# Patient Record
Sex: FEMALE | Race: OTHER | NOT HISPANIC OR LATINO
[De-identification: names, ages, dates, MRNs, and addresses within clinical notes are randomized per-mention and may not be internally consistent; named-entity substitution may affect disease eponyms.]

---

## 2017-05-19 ENCOUNTER — LABORATORY RESULT (OUTPATIENT)
Age: 22
End: 2017-05-19

## 2017-05-19 ENCOUNTER — APPOINTMENT (OUTPATIENT)
Dept: OBGYN | Facility: CLINIC | Age: 22
End: 2017-05-19

## 2017-05-19 ENCOUNTER — OUTPATIENT (OUTPATIENT)
Dept: OUTPATIENT SERVICES | Facility: HOSPITAL | Age: 22
LOS: 1 days | End: 2017-05-19
Payer: SELF-PAY

## 2017-05-19 ENCOUNTER — RESULT REVIEW (OUTPATIENT)
Age: 22
End: 2017-05-19

## 2017-05-19 VITALS — WEIGHT: 164 LBS | SYSTOLIC BLOOD PRESSURE: 114 MMHG | DIASTOLIC BLOOD PRESSURE: 70 MMHG

## 2017-05-19 DIAGNOSIS — Z01.419 ENCOUNTER FOR GYNECOLOGICAL EXAMINATION (GENERAL) (ROUTINE) W/OUT ABNORMAL FINDINGS: ICD-10-CM

## 2017-05-19 DIAGNOSIS — Z12.4 ENCOUNTER FOR GYNECOLOGICAL EXAMINATION (GENERAL) (ROUTINE) W/OUT ABNORMAL FINDINGS: ICD-10-CM

## 2017-05-19 DIAGNOSIS — Z30.9 ENCOUNTER FOR CONTRACEPTIVE MANAGEMENT, UNSPECIFIED: ICD-10-CM

## 2017-05-19 DIAGNOSIS — N76.0 ACUTE VAGINITIS: ICD-10-CM

## 2017-05-19 PROCEDURE — 87340 HEPATITIS B SURFACE AG IA: CPT

## 2017-05-19 PROCEDURE — 88141 CYTOPATH C/V INTERPRET: CPT

## 2017-05-19 PROCEDURE — 86803 HEPATITIS C AB TEST: CPT

## 2017-05-19 PROCEDURE — G0463: CPT

## 2017-05-19 PROCEDURE — 86592 SYPHILIS TEST NON-TREP QUAL: CPT

## 2017-05-19 PROCEDURE — 88175 CYTOPATH C/V AUTO FLUID REDO: CPT

## 2017-05-19 PROCEDURE — 87389 HIV-1 AG W/HIV-1&-2 AB AG IA: CPT

## 2017-05-19 RX ORDER — NORETHINDRONE ACETATE AND ETHINYL ESTRADIOL 1MG-20(21)
1-20 KIT ORAL
Qty: 90 | Refills: 3 | Status: ACTIVE | COMMUNITY
Start: 2017-05-19 | End: 1900-01-01

## 2017-05-20 LAB
C TRACH RRNA SPEC QL NAA+PROBE: SIGNIFICANT CHANGE UP
CANDIDA AB TITR SER: DETECTED
G VAGINALIS DNA SPEC QL NAA+PROBE: DETECTED
HBV SURFACE AG SER-ACNC: SIGNIFICANT CHANGE UP
HCV AB S/CO SERPL IA: 0.16 S/CO — SIGNIFICANT CHANGE UP
HCV AB SERPL-IMP: SIGNIFICANT CHANGE UP
HIV 1+2 AB+HIV1 P24 AG SERPL QL IA: SIGNIFICANT CHANGE UP
N GONORRHOEA RRNA SPEC QL NAA+PROBE: SIGNIFICANT CHANGE UP
RPR SERPL-ACNC: SIGNIFICANT CHANGE UP
SPECIMEN SOURCE: SIGNIFICANT CHANGE UP
T VAGINALIS SPEC QL WET PREP: SIGNIFICANT CHANGE UP

## 2017-05-22 ENCOUNTER — OTHER (OUTPATIENT)
Age: 22
End: 2017-05-22

## 2017-05-22 DIAGNOSIS — N89.8 OTHER SPECIFIED NONINFLAMMATORY DISORDERS OF VAGINA: ICD-10-CM

## 2017-05-22 PROBLEM — Z01.419 ENCOUNTER FOR GYNECOLOGICAL EXAMINATION WITH PAPANICOLAOU SMEAR OF CERVIX: Status: RESOLVED | Noted: 2017-05-19 | Resolved: 2017-05-22

## 2017-05-23 DIAGNOSIS — Z01.419 ENCOUNTER FOR GYNECOLOGICAL EXAMINATION (GENERAL) (ROUTINE) WITHOUT ABNORMAL FINDINGS: ICD-10-CM

## 2017-05-23 DIAGNOSIS — N89.8 OTHER SPECIFIED NONINFLAMMATORY DISORDERS OF VAGINA: ICD-10-CM

## 2017-05-23 RX ORDER — METRONIDAZOLE 500 MG/1
500 TABLET ORAL TWICE DAILY
Qty: 10 | Refills: 0 | Status: ACTIVE | COMMUNITY
Start: 2017-05-22 | End: 1900-01-01

## 2017-05-23 RX ORDER — FLUCONAZOLE 150 MG/1
150 TABLET ORAL
Qty: 2 | Refills: 0 | Status: ACTIVE | COMMUNITY
Start: 2017-05-22 | End: 1900-01-01

## 2017-05-24 LAB — CYTOLOGY SPEC DOC CYTO: SIGNIFICANT CHANGE UP

## 2017-06-27 ENCOUNTER — EMERGENCY (EMERGENCY)
Facility: HOSPITAL | Age: 22
LOS: 1 days | Discharge: HOME | End: 2017-06-27
Attending: EMERGENCY MEDICINE | Admitting: EMERGENCY MEDICINE
Payer: SELF-PAY

## 2017-06-27 VITALS
RESPIRATION RATE: 20 BRPM | TEMPERATURE: 99 F | SYSTOLIC BLOOD PRESSURE: 117 MMHG | DIASTOLIC BLOOD PRESSURE: 77 MMHG | HEART RATE: 102 BPM | OXYGEN SATURATION: 99 %

## 2017-06-27 LAB — S PYO AG SPEC QL IA: POSITIVE

## 2017-06-27 PROCEDURE — 99284 EMERGENCY DEPT VISIT MOD MDM: CPT | Mod: 25

## 2017-06-27 PROCEDURE — 87880 STREP A ASSAY W/OPTIC: CPT

## 2017-06-27 PROCEDURE — 76536 US EXAM OF HEAD AND NECK: CPT

## 2017-06-27 PROCEDURE — 76536 US EXAM OF HEAD AND NECK: CPT | Mod: 26

## 2017-06-27 RX ORDER — TETRACAINE/BENZOCAINE/BUTAMBEN 2%-14%-2%
1 OINTMENT (GRAM) TOPICAL ONCE
Qty: 0 | Refills: 0 | Status: COMPLETED | OUTPATIENT
Start: 2017-06-27 | End: 2017-06-27

## 2017-06-27 RX ORDER — AMOXICILLIN 250 MG/5ML
1 SUSPENSION, RECONSTITUTED, ORAL (ML) ORAL
Qty: 20 | Refills: 0
Start: 2017-06-27 | End: 2017-07-07

## 2017-06-27 RX ORDER — LIDOCAINE 4 G/100G
10 CREAM TOPICAL ONCE
Qty: 0 | Refills: 0 | Status: COMPLETED | OUTPATIENT
Start: 2017-06-27 | End: 2017-06-27

## 2017-06-27 RX ORDER — DEXAMETHASONE 0.5 MG/5ML
8 ELIXIR ORAL ONCE
Qty: 0 | Refills: 0 | Status: COMPLETED | OUTPATIENT
Start: 2017-06-27 | End: 2017-06-27

## 2017-06-27 RX ORDER — ACETAMINOPHEN 500 MG
975 TABLET ORAL ONCE
Qty: 0 | Refills: 0 | Status: COMPLETED | OUTPATIENT
Start: 2017-06-27 | End: 2017-06-27

## 2017-06-27 RX ORDER — AMOXICILLIN 250 MG/5ML
500 SUSPENSION, RECONSTITUTED, ORAL (ML) ORAL ONCE
Qty: 0 | Refills: 0 | Status: COMPLETED | OUTPATIENT
Start: 2017-06-27 | End: 2017-06-27

## 2017-06-27 RX ADMIN — Medication 975 MILLIGRAM(S): at 02:59

## 2017-06-27 RX ADMIN — Medication 500 MILLIGRAM(S): at 04:13

## 2017-06-27 RX ADMIN — LIDOCAINE 10 MILLILITER(S): 4 CREAM TOPICAL at 04:13

## 2017-06-27 RX ADMIN — Medication 1 SPRAY(S): at 04:13

## 2017-06-27 RX ADMIN — Medication 8 MILLIGRAM(S): at 04:13

## 2017-06-27 NOTE — ED PROVIDER NOTE - MEDICAL DECISION MAKING DETAILS
likely viral URI, will check strep throat and reeval. likely viral URI, will check strep throat and reeval.  Attending Aguilar: 21 y /o female preesntign with sore throat and uri symtpoms. no evidence of pta. pt with strep. will give abx, handling secretions no respiratory distress or evidence of ludwigs

## 2017-06-27 NOTE — ED PROVIDER NOTE - PLAN OF CARE
1) take amoxicillin as prescribed   2) Take Tylenol 325mg tablet, take 2 tablets every 6-8 hours as needed for pain or fever greater than 100.4  3) Please follow up with your primary medical doctor in 2-3 days for reevaluation. If you do not have pmd please call the general medicine clinic for an appointment at [phone].   4) Return to the ED for worsening pain, ear pain, sore throat, cough, congestion, fever greater than 100.4, nausea, vomiting, chest pain, abdominal pain, or if you have any other new, worsening, or concerning symptoms.

## 2017-06-27 NOTE — ED PROVIDER NOTE - OBJECTIVE STATEMENT
21 year old female, no past medical history, presents to the ED for fever for 1 day. Tmax: 100.1 Also reports sore throat, dry cough, and right ear pain. Patient took Dayquil with some improvement in pain. Patient is concerned because she has had URI in past that led to perforation of ear drum. No nasal congestion.    no primary medical doctor 21 year old female, no past medical history, presents to the ED for fever for 1 day. Tmax: 100.1 Also reports sore throat, dry cough, and right ear pain. Patient took Dayquil with some improvement in pain. Patient is concerned because she has had URI in past that led to perforation of ear drum. No nasal congestion. No chest pain or shortness of breath. No sick contacts, no recent travel. No recent abx.     no primary medical doctor

## 2017-06-27 NOTE — ED SUB INTERN NOTE - OBJECTIVE STATEMENT FT
Pt is a 21F presenting with 1d fever, sore throat, dry cough, and R ear pain.    Pt was in usual state of health until today when she began feeling feverish (Tmax 100.1) with sore throat, dry cough, headache, and body aches/joint pains. She took Dayquil and Cepacol to moderate relief. In the evening she began having R ear pain that worsened when she was lying down, 9/10 severity. She has had similar symptoms leading to TM perforation in the past, and came in concerned for that outcome. No nasal congestion, CP, SOB, abd pain, NVDC.

## 2017-06-27 NOTE — ED PROVIDER NOTE - THROAT FINDINGS
no exudate/NO VESICLES/ULCERS/THROAT RED/NO TONGUE ELEVATION/uvula midline/TONSILLAR SWELLING/NO STRIDOR

## 2017-06-27 NOTE — ED SUB INTERN NOTE - PROGRESS NOTE DETAILS
2/4 Centor criteria. Viral URI likely vs strep. 2/4 Centor criteria. Viral URI likely vs strep. Sent rapid strep, inclined to d/c home pending results. Will reassess. Rapid strep positive. Inclined to d/c with amoxicillin.

## 2017-06-27 NOTE — ED PROVIDER NOTE - PHYSICAL EXAMINATION
Attending Sheikh: Gen: NAD, heent: atrauamtic, eomi, perrla, mmm, op pink, uvula midline, enlargement b/l tonsils, right greater with exudtae,neck; nttp, some submadibular lymphadenopathy, no nuchal rigidity, chest: nttp, no crepitus, cv: rrr, no murmurs, lungs: ctab, abd: soft, nontender, nondistended, no peritoneal signs, +BS, no guarding, ext: wwp, neg homans, skin: no rash, neuro: awake and alert, following commands, speech clear, sensation and strength intact, no focal deficits

## 2017-06-27 NOTE — ED ADULT NURSE NOTE - OBJECTIVE STATEMENT
22 yo F arrived to the ED c/o sore throat x1 day; c/o fever/chills; on assessment throat is swollen; afebrile; c/o R ear pain; md @ bedside; denies difficulty breathing/swallowing

## 2017-06-27 NOTE — ED PROVIDER NOTE - CARE PLAN
Principal Discharge DX:	Strep throat  Instructions for follow-up, activity and diet:	1) take amoxicillin as prescribed   2) Take Tylenol 325mg tablet, take 2 tablets every 6-8 hours as needed for pain or fever greater than 100.4  3) Please follow up with your primary medical doctor in 2-3 days for reevaluation. If you do not have pmd please call the general medicine clinic for an appointment at [phone].   4) Return to the ED for worsening pain, ear pain, sore throat, cough, congestion, fever greater than 100.4, nausea, vomiting, chest pain, abdominal pain, or if you have any other new, worsening, or concerning symptoms. Principal Discharge DX:	Strep throat  Instructions for follow-up, activity and diet:	1) take amoxicillin as prescribed   2) Take Tylenol 325mg tablet, take 2 tablets every 6-8 hours as needed for pain or fever greater than 100.4  3) Please follow up with your primary medical doctor in 2-3 days for reevaluation. If you do not have pmd please call the general medicine clinic for an appointment at [phone].   4) Return to the ED for worsening pain, ear pain, sore throat, cough, congestion, fever greater than 100.4, nausea, vomiting, chest pain, abdominal pain, or if you have any other new, worsening, or concerning symptoms.

## 2017-12-21 ENCOUNTER — EMERGENCY (EMERGENCY)
Facility: HOSPITAL | Age: 22
LOS: 1 days | Discharge: HOME | End: 2017-12-21
Attending: EMERGENCY MEDICINE | Admitting: EMERGENCY MEDICINE
Payer: COMMERCIAL

## 2017-12-21 VITALS
DIASTOLIC BLOOD PRESSURE: 61 MMHG | HEART RATE: 102 BPM | OXYGEN SATURATION: 98 % | TEMPERATURE: 98 F | RESPIRATION RATE: 20 BRPM | SYSTOLIC BLOOD PRESSURE: 96 MMHG

## 2017-12-21 VITALS
SYSTOLIC BLOOD PRESSURE: 116 MMHG | TEMPERATURE: 97 F | HEART RATE: 139 BPM | RESPIRATION RATE: 20 BRPM | DIASTOLIC BLOOD PRESSURE: 76 MMHG | OXYGEN SATURATION: 98 %

## 2017-12-21 PROCEDURE — 99284 EMERGENCY DEPT VISIT MOD MDM: CPT | Mod: 25

## 2017-12-21 PROCEDURE — 96375 TX/PRO/DX INJ NEW DRUG ADDON: CPT

## 2017-12-21 PROCEDURE — 96374 THER/PROPH/DIAG INJ IV PUSH: CPT

## 2017-12-21 RX ORDER — SODIUM CHLORIDE 9 MG/ML
1000 INJECTION INTRAMUSCULAR; INTRAVENOUS; SUBCUTANEOUS ONCE
Qty: 0 | Refills: 0 | Status: COMPLETED | OUTPATIENT
Start: 2017-12-21 | End: 2017-12-21

## 2017-12-21 RX ORDER — DIPHENHYDRAMINE HCL 50 MG
25 CAPSULE ORAL ONCE
Qty: 0 | Refills: 0 | Status: COMPLETED | OUTPATIENT
Start: 2017-12-21 | End: 2017-12-21

## 2017-12-21 RX ORDER — DEXAMETHASONE 0.5 MG/5ML
10 ELIXIR ORAL ONCE
Qty: 0 | Refills: 0 | Status: DISCONTINUED | OUTPATIENT
Start: 2017-12-21 | End: 2017-12-21

## 2017-12-21 RX ORDER — FAMOTIDINE 10 MG/ML
20 INJECTION INTRAVENOUS ONCE
Qty: 0 | Refills: 0 | Status: COMPLETED | OUTPATIENT
Start: 2017-12-21 | End: 2017-12-21

## 2017-12-21 RX ADMIN — Medication 25 MILLIGRAM(S): at 01:01

## 2017-12-21 RX ADMIN — Medication 25 MILLIGRAM(S): at 01:25

## 2017-12-21 RX ADMIN — FAMOTIDINE 20 MILLIGRAM(S): 10 INJECTION INTRAVENOUS at 01:24

## 2017-12-21 RX ADMIN — SODIUM CHLORIDE 2000 MILLILITER(S): 9 INJECTION INTRAMUSCULAR; INTRAVENOUS; SUBCUTANEOUS at 01:25

## 2017-12-21 RX ADMIN — Medication 125 MILLIGRAM(S): at 01:24

## 2017-12-21 NOTE — ED PROVIDER NOTE - PHYSICAL EXAMINATION
Gen: NAD  Eyes:  sclerae white, no icterus  ENT: Clear oropharynx  Neck: supple, no LAD, mass or goiter, trachea midline  CV: RRR. Audible S1 and S2. No murmurs, rubs, gallops, S3, nor S4  Pulm: Clear to auscultation bilaterally. No wheezes, rales, or rhonchi  Abd: BS+, nondistended, No tenderness to palpation  Musculoskeletal:  No edema  Skin: Diffuse uticaria  Psych: mood good, affect full range and congruent with mood.  Neurologic: AAOx3 Gen: NAD  Eyes:  sclerae white, no icterus  ENT: Clear oropharynx. tongue normal size, no uvular edema.   Neck: supple, no LAD, mass or goiter, trachea midline  CV: RRR. Audible S1 and S2. No murmurs, rubs, gallops, S3, nor S4  Pulm: Clear to auscultation bilaterally. No wheezes, rales, or rhonchi  Abd: BS+, nondistended, No tenderness to palpation  Musculoskeletal:  No edema  Skin: Diffuse uticaria and swelling on arms and legs and torso.   Psych: mood good, affect full range and congruent with mood.  Neurologic: AAOx3

## 2017-12-21 NOTE — ED PROVIDER NOTE - MEDICAL DECISION MAKING DETAILS
Acute allergic rxn after eating seafood at holiday party w/ ETOH. No known prior food allergies. Uticaria but no SOB, wheezing, n/v, or hypotension. Plan: Benadryl, steroids, observation, reassess. Acute allergic rxn after eating seafood at holiday party w/ ETOH. No known prior food allergies. Uticaria but no SOB, wheezing, n/v, or hypotension. Plan: Benadryl, pepcid, steroids, observation, reassess.

## 2017-12-21 NOTE — ED ADULT NURSE NOTE - OBJECTIVE STATEMENT
22 year old female came into the ER via ambulation with c/o allergic reaction with redness and swelling generalized over entire body. Pt states she was out at a work [place] party and started to develop hives on her face and swelling of her hands. Pt was eating shellfish and drinking alcohol, but has never had a reaction to either in the past. Pt lung sounds clear, and no airway obstruction noted. Pt has no c/o SOB, CP, N/V/D, abd pain, fever or chills. MD at bedside to tapan, comfort and safety maintained.

## 2017-12-21 NOTE — ED PROVIDER NOTE - ATTENDING CONTRIBUTION TO CARE
23 yo F presents with diffuse urticaria and swelling of her extremities and torso that started at 11:30pm while she was at a holiday party. She drank some alcohol and ate seafood, but states that she hasn't eaten anything tonight that she hasn't had in the past.   She denies any tongue swelling, no cp, sob, abd pain, N/V.   PE with diffuse hives and swelling on extremities. oropharynx appears normal with no tongue swelling or uvular edema. lungs CTA. VS normal.   Patient was treated with IVFs, benadryl, pepcid and solumedrol in the ER. on pt re-assessment she had full resolution of all hives. she had no complaints. VS stable.   PAtient was discharged with instructions to drink plenty of fluids, benadryl every 6 hours for the next 24 hours and then PRN afterwards, pepcid daily, and f/u with PMD in 1-2 days for repeat eval and further management. She was not discharged with steroids due to low severity of the allergic reaction with only skin manifestations.     The patient was discharged from the ED in stable condition. All results of today's workup were discussed with the patient and all questions/concerns were addressed. All discharge instructions were thoroughly discussed with the patient, as well as important warning signs and new/ worsening symptoms which should necessitate patient's immediate return to the ED. The patient is agreeable with discharge and expresses full understanding of all instructions given.

## 2017-12-21 NOTE — ED PROVIDER NOTE - OBJECTIVE STATEMENT
21 y/o F w/ no PMHx, allergy to motrin only, p/w diffuse uticaria and swelling, was at a [place] party, had few glasses of ETOH along w/ seafood and other food. No SOB, wheezing, or n/v/d. No known food allergies. Symptoms started around 11:30pm. 23 y/o F w/ no PMHx, allergy to motrin only, p/w diffuse uticaria and peripheral swelling, was at a [employer] party, had few glasses of ETOH along w/ seafood and other food. No SOB, wheezing, or n/v/d. No known food allergies. Symptoms started around 11:30pm.

## 2018-09-10 ENCOUNTER — EMERGENCY (EMERGENCY)
Facility: HOSPITAL | Age: 23
LOS: 1 days | Discharge: HOME | End: 2018-09-10
Attending: EMERGENCY MEDICINE
Payer: COMMERCIAL

## 2018-09-10 VITALS
HEIGHT: 62 IN | HEART RATE: 70 BPM | OXYGEN SATURATION: 99 % | WEIGHT: 158.07 LBS | DIASTOLIC BLOOD PRESSURE: 69 MMHG | SYSTOLIC BLOOD PRESSURE: 102 MMHG | TEMPERATURE: 98 F | RESPIRATION RATE: 18 BRPM

## 2018-09-10 PROCEDURE — 87186 SC STD MICRODIL/AGAR DIL: CPT

## 2018-09-10 PROCEDURE — 99283 EMERGENCY DEPT VISIT LOW MDM: CPT

## 2018-09-10 PROCEDURE — 87086 URINE CULTURE/COLONY COUNT: CPT

## 2018-09-10 PROCEDURE — 99283 EMERGENCY DEPT VISIT LOW MDM: CPT | Mod: 25

## 2018-09-10 RX ORDER — CEPHALEXIN 500 MG
1 CAPSULE ORAL
Qty: 20 | Refills: 0
Start: 2018-09-10 | End: 2018-09-19

## 2018-09-10 RX ORDER — CEPHALEXIN 500 MG
500 CAPSULE ORAL ONCE
Qty: 0 | Refills: 0 | Status: COMPLETED | OUTPATIENT
Start: 2018-09-10 | End: 2018-09-10

## 2018-09-10 RX ADMIN — Medication 500 MILLIGRAM(S): at 23:50

## 2018-09-10 NOTE — ED PROVIDER NOTE - OBJECTIVE STATEMENT
23yof no PMH woke up yesterday w/ 23yof no PMH woke up yesterday w/ suprapubic cramping pain and burning urination, persisted through the day and tonight noted streaks of blood in her urine. No fevers, chills or other systemic symptoms. No flank pain. No pelvic complaints. 23yof no PMH woke up yesterday w/ suprapubic cramping pain and burning urination, persisted through the day and tonight noted streaks of blood in her urine. No fevers, chills or other systemic symptoms. No flank pain. No pelvic complaints, but does feel like the pain does lateralize more to the right.

## 2018-09-10 NOTE — ED PROVIDER NOTE - MEDICAL DECISION MAKING DETAILS
Healthy 23yof w/ dysuria, suprapubic pain c/w cystitis, no systemic symptoms or flank pain to suggest pyelonephritis or ascending infection. Will send urine culture, start keflex empirically.

## 2018-09-10 NOTE — ED ADULT TRIAGE NOTE - CHIEF COMPLAINT QUOTE
"at 5 am I woke up to go to the bathroom and I felt burning on urination and now I see blood on my urine"

## 2018-09-11 VITALS
HEART RATE: 75 BPM | SYSTOLIC BLOOD PRESSURE: 101 MMHG | TEMPERATURE: 98 F | OXYGEN SATURATION: 99 % | RESPIRATION RATE: 18 BRPM | DIASTOLIC BLOOD PRESSURE: 62 MMHG

## 2018-09-11 NOTE — ED ADULT NURSE NOTE - OBJECTIVE STATEMENT
23 year old female presented to ED with right suprapubic pain and burning on urination since pt woke up this morning. Pt states she saw streaks of blood in her urine. Pt denies CP, SOB, nausea/vomiting, numbness/tingling, fever, cough, chills, dizziness, headache, blurred vision. Neuro intact. Pt a&ox3, heart rate regular, abdomen soft nontender nondistended to palp. Skin intact. Pt currently resting in bed comfortably. Will continue to monitor and assess while offering support and reassurance.

## 2018-09-11 NOTE — ED ADULT NURSE NOTE - NSIMPLEMENTINTERV_GEN_ALL_ED
Implemented All Universal Safety Interventions:  [place] to call system. Call bell, personal items and telephone within reach. Instruct patient to call for assistance. Room bathroom lighting operational. Non-slip footwear when patient is off stretcher. Physically safe environment: no spills, clutter or unnecessary equipment. Stretcher in lowest position, wheels locked, appropriate side rails in place.

## 2018-09-12 NOTE — ED POST DISCHARGE NOTE - OTHER COMMUNICATION
9/12/18: Preliminary ucx + 10,000-49,000 E.coli. Discharged on Keflex for uti. Will await final sensitivities to determine need for contact vs appropriate care given. - Herb Pastrana PA-C

## 2018-11-14 ENCOUNTER — APPOINTMENT (OUTPATIENT)
Dept: INTERNAL MEDICINE | Facility: CLINIC | Age: 23
End: 2018-11-14

## 2019-03-16 ENCOUNTER — RESULT REVIEW (OUTPATIENT)
Age: 24
End: 2019-03-16

## 2020-02-18 ENCOUNTER — APPOINTMENT (OUTPATIENT)
Dept: PEDIATRIC MEDICAL GENETICS | Facility: CLINIC | Age: 25
End: 2020-02-18

## 2020-08-15 NOTE — ED ADULT NURSE NOTE - URINE COLOR
pink-tinged
labs and imaging reviewed. will treat for UTI. follow up with  [facility] clinic. Strict ED return precautions

## 2020-12-05 ENCOUNTER — TRANSCRIPTION ENCOUNTER (OUTPATIENT)
Age: 25
End: 2020-12-05

## 2020-12-15 PROBLEM — Z01.419 ENCOUNTER FOR GYNECOLOGICAL EXAMINATION WITH PAPANICOLAOU SMEAR OF CERVIX: Status: RESOLVED | Noted: 2017-05-22 | Resolved: 2020-12-15

## 2021-01-12 ENCOUNTER — APPOINTMENT (OUTPATIENT)
Dept: ORTHOPEDIC SURGERY | Facility: CLINIC | Age: 26
End: 2021-01-12

## 2021-09-01 NOTE — ED PROVIDER NOTE - NS ED NOTE AC HIGH RISK COUNTRIES
Pt returned back from radiation. Pt does not want anything to eat at this time.  Pt given cranberry juice   No

## 2021-11-17 ENCOUNTER — EMERGENCY (EMERGENCY)
Facility: HOSPITAL | Age: 26
LOS: 1 days | Discharge: HOME | End: 2021-11-17
Attending: EMERGENCY MEDICINE
Payer: MEDICAID

## 2021-11-17 VITALS
SYSTOLIC BLOOD PRESSURE: 105 MMHG | RESPIRATION RATE: 16 BRPM | TEMPERATURE: 100 F | OXYGEN SATURATION: 99 % | DIASTOLIC BLOOD PRESSURE: 72 MMHG | HEART RATE: 97 BPM

## 2021-11-17 VITALS
HEIGHT: 62 IN | WEIGHT: 141.98 LBS | SYSTOLIC BLOOD PRESSURE: 119 MMHG | OXYGEN SATURATION: 98 % | DIASTOLIC BLOOD PRESSURE: 79 MMHG | HEART RATE: 118 BPM | RESPIRATION RATE: 16 BRPM | TEMPERATURE: 100 F

## 2021-11-17 LAB
FLUAV H1 2009 PAND RNA SPEC QL NAA+PROBE: DETECTED
RAPID RVP RESULT: DETECTED
SARS-COV-2 RNA SPEC QL NAA+PROBE: SIGNIFICANT CHANGE UP

## 2021-11-17 PROCEDURE — 99284 EMERGENCY DEPT VISIT MOD MDM: CPT

## 2021-11-17 PROCEDURE — 0225U NFCT DS DNA&RNA 21 SARSCOV2: CPT

## 2021-11-17 PROCEDURE — 99283 EMERGENCY DEPT VISIT LOW MDM: CPT

## 2021-11-17 RX ORDER — ACETAMINOPHEN 500 MG
650 TABLET ORAL ONCE
Refills: 0 | Status: COMPLETED | OUTPATIENT
Start: 2021-11-17 | End: 2021-11-17

## 2021-11-17 RX ADMIN — Medication 200 MILLIGRAM(S): at 11:07

## 2021-11-17 RX ADMIN — Medication 650 MILLIGRAM(S): at 11:07

## 2021-11-17 NOTE — ED ADULT NURSE NOTE - OBJECTIVE STATEMENT
Pt presents to ED reporting generalized body aches and fevers at home with dry cough X 3 days, AXOX3, DEVINE, taking tylenol at home with partial relief, last dose yesterday, breathing unlabored, symmetrical, no shortness of breath, no chest pain, no n/v/d. Denies abdominal pain, reports constipation beginning monday, denies hematuria or dysuria.

## 2021-11-17 NOTE — ED PROVIDER NOTE - CLINICAL SUMMARY MEDICAL DECISION MAKING FREE TEXT BOX
25yo F denies pmhx presents for generalized bodyaches and dry cough. Well appearing.  lungs CTAB.  Sating 100% on RA.  exertional sat 99%.  2nd COVID19 vaccine 2 weeks ago.    -Will swab for COVID19 and RVP.  Discussed with patient supportive care.  Strict return precautions given.

## 2021-11-17 NOTE — ED PROVIDER NOTE - PATIENT PORTAL LINK FT
You can access the FollowMyHealth Patient Portal offered by [facility] by registering at the following website: [email or web address]. By joining [employer]’s FollowMyHealth portal, you will also be able to view your health information using other applications (apps) compatible with our system.

## 2021-11-17 NOTE — ED PROVIDER NOTE - OBJECTIVE STATEMENT
27yo F denies pmhx presents to ER for generalized bodyaches and dry cough x2days.  Took theraflu and Tylenol yesterday.  has been fully vaccinated against COVID19 - 2nd dose 11/3/2021.  Denies smoking, hx of asthma.  Denies fever, chills, CP, SOB, n/v/d, urinary symptoms. Sick contacts.

## 2021-11-17 NOTE — ED PROVIDER NOTE - ATTENDING CONTRIBUTION TO CARE
Pt with body aches, frontal headache, fever for few days.  Nontoxic appearing, no meningeal signs, clear lungs.  Educated about viral illness, against unnecessary imaging, and return precuations including dyspnea at rest, vomiting, etc...

## 2021-11-17 NOTE — ED PROVIDER NOTE - ENMT, MLM
Airway patent, Nasal mucosa clear. Mouth with normal mucosa. Throat has no vesicles, Mildly erythematous tonsils with out exudates.   no oropharyngeal exudates and uvula is midline.

## 2021-11-17 NOTE — ED PROVIDER NOTE - NSFOLLOWUPINSTRUCTIONS_ED_ALL_ED_FT
You were evaluated for bodyaches and dry cough.      You were swabbed for virus included COVID19.  Please follow up your results on [facility] portal. You will receive a call if your swab is positive.  Increase water intake.  Rest.  May take Tylenol or Excedrin (which has caffeine).  May continue to take over the counter medication -Theraflu.  Monitor your symptoms - return to ER for any worsening or concerning symptoms (shortness of breath at rest, vomiting, inability to keep down foods, etc)     No signs of emergency medical condition on today's workup.  Presumptive diagnosis made, but further evaluation may be required by your primary care doctor or specialist for a definitive diagnosis.  Therefore, follow up as directed and if symptoms change/worsen or any emergency conditions, please return to the ER.

## 2021-11-18 NOTE — ED POST DISCHARGE NOTE - DETAILS
11/18/21: LM  to call back admin line for results. Prakash Pierson PA-C 11/18/21: Pt called back, aware of influenza results. States she is feeling well. Advised supportive care, tylenol/motrin as needed for fever. Recommended wearing mask in public and hand hygiene. Has PMD to f/u with. Prakash Pierson PA-C

## 2022-01-25 ENCOUNTER — APPOINTMENT (OUTPATIENT)
Dept: ORTHOPEDIC SURGERY | Facility: CLINIC | Age: 27
End: 2022-01-25

## 2022-02-18 DIAGNOSIS — M75.101 UNSPECIFIED ROTATOR CUFF TEAR OR RUPTURE OF RIGHT SHOULDER, NOT SPECIFIED AS TRAUMATIC: ICD-10-CM

## 2022-02-22 ENCOUNTER — APPOINTMENT (OUTPATIENT)
Dept: ORTHOPEDIC SURGERY | Facility: CLINIC | Age: 27
End: 2022-02-22

## 2022-03-22 ENCOUNTER — EMERGENCY (EMERGENCY)
Facility: HOSPITAL | Age: 27
LOS: 1 days | Discharge: HOME | End: 2022-03-22
Attending: EMERGENCY MEDICINE
Payer: MEDICAID

## 2022-03-22 VITALS
HEART RATE: 96 BPM | RESPIRATION RATE: 16 BRPM | OXYGEN SATURATION: 99 % | SYSTOLIC BLOOD PRESSURE: 115 MMHG | TEMPERATURE: 99 F | DIASTOLIC BLOOD PRESSURE: 80 MMHG

## 2022-03-22 VITALS
SYSTOLIC BLOOD PRESSURE: 121 MMHG | TEMPERATURE: 100 F | DIASTOLIC BLOOD PRESSURE: 74 MMHG | RESPIRATION RATE: 18 BRPM | HEART RATE: 100 BPM | WEIGHT: 149.91 LBS | HEIGHT: 62 IN | OXYGEN SATURATION: 97 %

## 2022-03-22 LAB
ALBUMIN SERPL ELPH-MCNC: 4.5 G/DL — SIGNIFICANT CHANGE UP (ref 3.3–5)
ALP SERPL-CCNC: 91 U/L — SIGNIFICANT CHANGE UP (ref 40–120)
ALT FLD-CCNC: 11 U/L — SIGNIFICANT CHANGE UP (ref 10–45)
ANION GAP SERPL CALC-SCNC: 14 MMOL/L — SIGNIFICANT CHANGE UP (ref 5–17)
AST SERPL-CCNC: 18 U/L — SIGNIFICANT CHANGE UP (ref 10–40)
BASOPHILS # BLD AUTO: 0.05 K/UL — SIGNIFICANT CHANGE UP (ref 0–0.2)
BASOPHILS NFR BLD AUTO: 0.3 % — SIGNIFICANT CHANGE UP (ref 0–2)
BILIRUB SERPL-MCNC: 0.2 MG/DL — SIGNIFICANT CHANGE UP (ref 0.2–1.2)
BUN SERPL-MCNC: 8 MG/DL — SIGNIFICANT CHANGE UP (ref 7–23)
CALCIUM SERPL-MCNC: 9.3 MG/DL — SIGNIFICANT CHANGE UP (ref 8.4–10.5)
CHLORIDE SERPL-SCNC: 100 MMOL/L — SIGNIFICANT CHANGE UP (ref 96–108)
CO2 SERPL-SCNC: 24 MMOL/L — SIGNIFICANT CHANGE UP (ref 22–31)
CREAT SERPL-MCNC: 0.48 MG/DL — LOW (ref 0.5–1.3)
EGFR: 133 ML/MIN/1.73M2 — SIGNIFICANT CHANGE UP
EOSINOPHIL # BLD AUTO: 0.02 K/UL — SIGNIFICANT CHANGE UP (ref 0–0.5)
EOSINOPHIL NFR BLD AUTO: 0.1 % — SIGNIFICANT CHANGE UP (ref 0–6)
GLUCOSE SERPL-MCNC: 116 MG/DL — HIGH (ref 70–99)
HCT VFR BLD CALC: 37.9 % — SIGNIFICANT CHANGE UP (ref 34.5–45)
HGB BLD-MCNC: 12.8 G/DL — SIGNIFICANT CHANGE UP (ref 11.5–15.5)
IMM GRANULOCYTES NFR BLD AUTO: 0.6 % — SIGNIFICANT CHANGE UP (ref 0–1.5)
LYMPHOCYTES # BLD AUTO: 1.15 K/UL — SIGNIFICANT CHANGE UP (ref 1–3.3)
LYMPHOCYTES # BLD AUTO: 6.2 % — LOW (ref 13–44)
MCHC RBC-ENTMCNC: 30.8 PG — SIGNIFICANT CHANGE UP (ref 27–34)
MCHC RBC-ENTMCNC: 33.8 GM/DL — SIGNIFICANT CHANGE UP (ref 32–36)
MCV RBC AUTO: 91.1 FL — SIGNIFICANT CHANGE UP (ref 80–100)
MONOCYTES # BLD AUTO: 1.27 K/UL — HIGH (ref 0–0.9)
MONOCYTES NFR BLD AUTO: 6.8 % — SIGNIFICANT CHANGE UP (ref 2–14)
NEUTROPHILS # BLD AUTO: 15.97 K/UL — HIGH (ref 1.8–7.4)
NEUTROPHILS NFR BLD AUTO: 86 % — HIGH (ref 43–77)
NRBC # BLD: 0 /100 WBCS — SIGNIFICANT CHANGE UP (ref 0–0)
PLATELET # BLD AUTO: 317 K/UL — SIGNIFICANT CHANGE UP (ref 150–400)
POTASSIUM SERPL-MCNC: 3.8 MMOL/L — SIGNIFICANT CHANGE UP (ref 3.5–5.3)
POTASSIUM SERPL-SCNC: 3.8 MMOL/L — SIGNIFICANT CHANGE UP (ref 3.5–5.3)
PROT SERPL-MCNC: 7.5 G/DL — SIGNIFICANT CHANGE UP (ref 6–8.3)
RBC # BLD: 4.16 M/UL — SIGNIFICANT CHANGE UP (ref 3.8–5.2)
RBC # FLD: 14.2 % — SIGNIFICANT CHANGE UP (ref 10.3–14.5)
S PYO AG SPEC QL IA: POSITIVE
SODIUM SERPL-SCNC: 138 MMOL/L — SIGNIFICANT CHANGE UP (ref 135–145)
WBC # BLD: 18.57 K/UL — HIGH (ref 3.8–10.5)
WBC # FLD AUTO: 18.57 K/UL — HIGH (ref 3.8–10.5)

## 2022-03-22 PROCEDURE — 99284 EMERGENCY DEPT VISIT MOD MDM: CPT | Mod: 25

## 2022-03-22 PROCEDURE — 87880 STREP A ASSAY W/OPTIC: CPT

## 2022-03-22 PROCEDURE — 96374 THER/PROPH/DIAG INJ IV PUSH: CPT

## 2022-03-22 PROCEDURE — 36415 COLL VENOUS BLD VENIPUNCTURE: CPT

## 2022-03-22 PROCEDURE — 80053 COMPREHEN METABOLIC PANEL: CPT

## 2022-03-22 PROCEDURE — 85025 COMPLETE CBC W/AUTO DIFF WBC: CPT

## 2022-03-22 PROCEDURE — 99284 EMERGENCY DEPT VISIT MOD MDM: CPT

## 2022-03-22 RX ORDER — AMOXICILLIN 250 MG/5ML
1 SUSPENSION, RECONSTITUTED, ORAL (ML) ORAL
Qty: 20 | Refills: 0
Start: 2022-03-22 | End: 2022-03-31

## 2022-03-22 RX ORDER — ACETAMINOPHEN 500 MG
650 TABLET ORAL EVERY 6 HOURS
Refills: 0 | Status: DISCONTINUED | OUTPATIENT
Start: 2022-03-22 | End: 2022-03-26

## 2022-03-22 RX ORDER — SODIUM CHLORIDE 9 MG/ML
1000 INJECTION, SOLUTION INTRAVENOUS ONCE
Refills: 0 | Status: COMPLETED | OUTPATIENT
Start: 2022-03-22 | End: 2022-03-22

## 2022-03-22 RX ORDER — DEXAMETHASONE 0.5 MG/5ML
6 ELIXIR ORAL ONCE
Refills: 0 | Status: COMPLETED | OUTPATIENT
Start: 2022-03-22 | End: 2022-03-22

## 2022-03-22 RX ORDER — PENICILLIN V POTASSIUM 250 MG
500 TABLET ORAL ONCE
Refills: 0 | Status: COMPLETED | OUTPATIENT
Start: 2022-03-22 | End: 2022-03-22

## 2022-03-22 RX ADMIN — Medication 500 MILLIGRAM(S): at 18:48

## 2022-03-22 RX ADMIN — Medication 650 MILLIGRAM(S): at 18:56

## 2022-03-22 RX ADMIN — SODIUM CHLORIDE 1000 MILLILITER(S): 9 INJECTION, SOLUTION INTRAVENOUS at 17:50

## 2022-03-22 RX ADMIN — Medication 6 MILLIGRAM(S): at 17:52

## 2022-03-22 RX ADMIN — Medication 650 MILLIGRAM(S): at 17:49

## 2022-03-22 NOTE — ED PROVIDER NOTE - NS ED ATTENDING STATEMENT MOD
This was a shared visit with the CORNELIO. I reviewed and verified the documentation and independently performed the documented:

## 2022-03-22 NOTE — ED PROVIDER NOTE - PHYSICAL EXAMINATION
GEN: Pt non-toxic in NAD, A&Ox3.  PSYCH: Affect and mood appropriate.  EYES: Sclera white w/o injection, EOMI, PERRLA.   ENT: +b/l tonsilitis grade 3 w/ exudates. Pharyngeal erythema. Uvula midline. No stridor. Tolerating secretions. No appreciated anterior or posterior cervical lymphadenopathy. Neck supple FROM.  RESP: CTA b/l, no wheezes, rales, or rhonchi.   CARDIAC: RRR, clear distinct S1, S2, no appreciable murmurs.  ABD: Abdomen soft, non-tender.  MSK: Moving all extremities.  NEURO: No focal motor or sensory deficits.  VASC: Radial pulses 2+ b/l. No edema or calf tenderness.  SKIN: No rashes or lesions.

## 2022-03-22 NOTE — ED PROVIDER NOTE - PATIENT PORTAL LINK FT
You can access the FollowMyHealth Patient Portal offered by [facility] by registering at the following website: [email or web address]. By joining [employer]’s FollowMyHealth portal, you will also be able to view your health information using other applications (apps) compatible with our system.

## 2022-03-22 NOTE — ED PROVIDER NOTE - PROGRESS NOTE DETAILS
Alcon Basilio PA-C: strepA+. Amox sent to confirmed pt pharmacy. Results and DC instructions reviewed w/ pt. VSnrml.

## 2022-03-22 NOTE — ED PROVIDER NOTE - NSFOLLOWUPINSTRUCTIONS_ED_ALL_ED_FT
1) Follow-up with your primary care provider in 1-2 days.    2) Take Amoxicillin 500mg twice daily for 10 days. Continue to take all medications as prescribed.    3) Rest and drink plenty of fluids. Pain can be managed with Acetaminophen (aka Tylenol) over the counter as directed. Take with food.    4) Return to the ER for any new or worsening symptoms such as uncontrollable fevers, worsening throat pain with difficult swallowing or tolerating your own saliva.

## 2022-03-22 NOTE — ED ADULT NURSE REASSESSMENT NOTE - NS ED NURSE REASSESS COMMENT FT1
Report given to receiving change of shift RN.  Slime ALVARADO , patient is in no acute distress. Patient vital signs stable, plan of care explained.

## 2022-03-22 NOTE — ED ADULT NURSE NOTE - OBJECTIVE STATEMENT
25yo F aaox4 denies PMHx, presents ambulatory from home to ED c/o sore throat, vomit, ha, R ear pain, body aches, pt states that yesterday sudden onset sore throat (R tonsil) eating with difficulty for the pain , this morning after a drink had an episode of vomiting, Pt reports chills:  took Tylenol x1/250mg at 1300 today. On examinations, Pt denies CP, SOB, HA, vision changes, n/d, Safety and comfort measures initiated- bed placed in lowest position and side rails raised. Pt oriented to call bell system.

## 2022-03-22 NOTE — ED PROVIDER NOTE - OBJECTIVE STATEMENT
26y F w/ no reported sig pmhx presents to c/o worsening sore throat, pain with swallowing x 2 days. Subject fevers this afternoon. Symptoms improved w/ tylenol at 1pm. Tolerating secretions, no voice changes. Denies sick contacts, cough, sob, abd pain. Non-smoker. Allergy to NSAIDS - rash.

## 2022-03-22 NOTE — ED PROVIDER NOTE - ATTENDING CONTRIBUTION TO CARE
Attending MD Jurado:   I personally have seen and examined this patient. I have performed a substantive portion of the visit including all aspects of the medical decision making.   Physician assistant note reviewed and agree on plan of care and except where noted.        26F presenting with throat pain x 2 days. Examination reveals b/l tonsillar hypertrophy with exudates, no trismus, neck supple, +ttp right submandibular LAD. Tonsillitis, likely viral but will run rapid strep. Will give decadron for symptom relief.           *The above represents an initial assessment/impression. Please refer to progress notes for potential changes in patient clinical course*

## 2022-04-25 ENCOUNTER — EMERGENCY (EMERGENCY)
Facility: HOSPITAL | Age: 27
LOS: 1 days | Discharge: HOME | End: 2022-04-25
Attending: EMERGENCY MEDICINE
Payer: MEDICAID

## 2022-04-25 VITALS
DIASTOLIC BLOOD PRESSURE: 72 MMHG | RESPIRATION RATE: 20 BRPM | SYSTOLIC BLOOD PRESSURE: 104 MMHG | HEART RATE: 90 BPM | OXYGEN SATURATION: 98 % | WEIGHT: 149.91 LBS | TEMPERATURE: 98 F | HEIGHT: 62 IN

## 2022-04-25 LAB
ALBUMIN SERPL ELPH-MCNC: 4.7 G/DL — SIGNIFICANT CHANGE UP (ref 3.3–5)
ALP SERPL-CCNC: 90 U/L — SIGNIFICANT CHANGE UP (ref 40–120)
ALT FLD-CCNC: 20 U/L — SIGNIFICANT CHANGE UP (ref 10–45)
ANION GAP SERPL CALC-SCNC: 15 MMOL/L — SIGNIFICANT CHANGE UP (ref 5–17)
APPEARANCE UR: CLEAR — SIGNIFICANT CHANGE UP
AST SERPL-CCNC: 19 U/L — SIGNIFICANT CHANGE UP (ref 10–40)
BACTERIA # UR AUTO: NEGATIVE — SIGNIFICANT CHANGE UP
BASE EXCESS BLDV CALC-SCNC: 1.2 MMOL/L — SIGNIFICANT CHANGE UP (ref -2–2)
BASOPHILS # BLD AUTO: 0.05 K/UL — SIGNIFICANT CHANGE UP (ref 0–0.2)
BASOPHILS NFR BLD AUTO: 0.6 % — SIGNIFICANT CHANGE UP (ref 0–2)
BILIRUB SERPL-MCNC: 0.3 MG/DL — SIGNIFICANT CHANGE UP (ref 0.2–1.2)
BILIRUB UR-MCNC: NEGATIVE — SIGNIFICANT CHANGE UP
BUN SERPL-MCNC: 7 MG/DL — SIGNIFICANT CHANGE UP (ref 7–23)
CA-I SERPL-SCNC: 1.21 MMOL/L — SIGNIFICANT CHANGE UP (ref 1.15–1.33)
CALCIUM SERPL-MCNC: 9.4 MG/DL — SIGNIFICANT CHANGE UP (ref 8.4–10.5)
CHLORIDE BLDV-SCNC: 99 MMOL/L — SIGNIFICANT CHANGE UP (ref 96–108)
CHLORIDE SERPL-SCNC: 98 MMOL/L — SIGNIFICANT CHANGE UP (ref 96–108)
CO2 BLDV-SCNC: 30 MMOL/L — HIGH (ref 22–26)
CO2 SERPL-SCNC: 23 MMOL/L — SIGNIFICANT CHANGE UP (ref 22–31)
COLOR SPEC: SIGNIFICANT CHANGE UP
CREAT SERPL-MCNC: 0.54 MG/DL — SIGNIFICANT CHANGE UP (ref 0.5–1.3)
DIFF PNL FLD: ABNORMAL
EGFR: 129 ML/MIN/1.73M2 — SIGNIFICANT CHANGE UP
EOSINOPHIL # BLD AUTO: 0.21 K/UL — SIGNIFICANT CHANGE UP (ref 0–0.5)
EOSINOPHIL NFR BLD AUTO: 2.7 % — SIGNIFICANT CHANGE UP (ref 0–6)
EPI CELLS # UR: 3 /HPF — SIGNIFICANT CHANGE UP
GAS PNL BLDV: 134 MMOL/L — LOW (ref 136–145)
GAS PNL BLDV: SIGNIFICANT CHANGE UP
GLUCOSE BLDV-MCNC: 97 MG/DL — SIGNIFICANT CHANGE UP (ref 70–99)
GLUCOSE SERPL-MCNC: 96 MG/DL — SIGNIFICANT CHANGE UP (ref 70–99)
GLUCOSE UR QL: NEGATIVE — SIGNIFICANT CHANGE UP
HCG SERPL-ACNC: <2 MIU/ML — SIGNIFICANT CHANGE UP
HCO3 BLDV-SCNC: 29 MMOL/L — SIGNIFICANT CHANGE UP (ref 22–29)
HCT VFR BLD CALC: 39.7 % — SIGNIFICANT CHANGE UP (ref 34.5–45)
HCT VFR BLDA CALC: 41 % — SIGNIFICANT CHANGE UP (ref 34.5–46.5)
HGB BLD CALC-MCNC: 13.5 G/DL — SIGNIFICANT CHANGE UP (ref 11.7–16.1)
HGB BLD-MCNC: 13.4 G/DL — SIGNIFICANT CHANGE UP (ref 11.5–15.5)
HYALINE CASTS # UR AUTO: 1 /LPF — SIGNIFICANT CHANGE UP (ref 0–7)
IMM GRANULOCYTES NFR BLD AUTO: 0.3 % — SIGNIFICANT CHANGE UP (ref 0–1.5)
KETONES UR-MCNC: ABNORMAL
LACTATE BLDV-MCNC: 0.8 MMOL/L — SIGNIFICANT CHANGE UP (ref 0.7–2)
LEUKOCYTE ESTERASE UR-ACNC: NEGATIVE — SIGNIFICANT CHANGE UP
LIDOCAIN IGE QN: 22 U/L — SIGNIFICANT CHANGE UP (ref 7–60)
LYMPHOCYTES # BLD AUTO: 1.79 K/UL — SIGNIFICANT CHANGE UP (ref 1–3.3)
LYMPHOCYTES # BLD AUTO: 23.2 % — SIGNIFICANT CHANGE UP (ref 13–44)
MCHC RBC-ENTMCNC: 30.6 PG — SIGNIFICANT CHANGE UP (ref 27–34)
MCHC RBC-ENTMCNC: 33.8 GM/DL — SIGNIFICANT CHANGE UP (ref 32–36)
MCV RBC AUTO: 90.6 FL — SIGNIFICANT CHANGE UP (ref 80–100)
MONOCYTES # BLD AUTO: 1.05 K/UL — HIGH (ref 0–0.9)
MONOCYTES NFR BLD AUTO: 13.6 % — SIGNIFICANT CHANGE UP (ref 2–14)
NEUTROPHILS # BLD AUTO: 4.6 K/UL — SIGNIFICANT CHANGE UP (ref 1.8–7.4)
NEUTROPHILS NFR BLD AUTO: 59.6 % — SIGNIFICANT CHANGE UP (ref 43–77)
NITRITE UR-MCNC: NEGATIVE — SIGNIFICANT CHANGE UP
NRBC # BLD: 0 /100 WBCS — SIGNIFICANT CHANGE UP (ref 0–0)
PCO2 BLDV: 57 MMHG — HIGH (ref 39–42)
PH BLDV: 7.31 — LOW (ref 7.32–7.43)
PH UR: 5.5 — SIGNIFICANT CHANGE UP (ref 5–8)
PLATELET # BLD AUTO: 325 K/UL — SIGNIFICANT CHANGE UP (ref 150–400)
PO2 BLDV: 22 MMHG — LOW (ref 25–45)
POTASSIUM BLDV-SCNC: 3.2 MMOL/L — LOW (ref 3.5–5.1)
POTASSIUM SERPL-MCNC: 3.5 MMOL/L — SIGNIFICANT CHANGE UP (ref 3.5–5.3)
POTASSIUM SERPL-SCNC: 3.5 MMOL/L — SIGNIFICANT CHANGE UP (ref 3.5–5.3)
PROT SERPL-MCNC: 8.3 G/DL — SIGNIFICANT CHANGE UP (ref 6–8.3)
PROT UR-MCNC: NEGATIVE — SIGNIFICANT CHANGE UP
RBC # BLD: 4.38 M/UL — SIGNIFICANT CHANGE UP (ref 3.8–5.2)
RBC # FLD: 13.6 % — SIGNIFICANT CHANGE UP (ref 10.3–14.5)
RBC CASTS # UR COMP ASSIST: 7 /HPF — HIGH (ref 0–4)
SAO2 % BLDV: 25.4 % — LOW (ref 67–88)
SODIUM SERPL-SCNC: 136 MMOL/L — SIGNIFICANT CHANGE UP (ref 135–145)
SP GR SPEC: 1.01 — SIGNIFICANT CHANGE UP (ref 1.01–1.02)
UROBILINOGEN FLD QL: NEGATIVE — SIGNIFICANT CHANGE UP
WBC # BLD: 7.72 K/UL — SIGNIFICANT CHANGE UP (ref 3.8–10.5)
WBC # FLD AUTO: 7.72 K/UL — SIGNIFICANT CHANGE UP (ref 3.8–10.5)
WBC UR QL: 0 /HPF — SIGNIFICANT CHANGE UP (ref 0–5)

## 2022-04-25 PROCEDURE — 99285 EMERGENCY DEPT VISIT HI MDM: CPT

## 2022-04-25 PROCEDURE — 93010 ELECTROCARDIOGRAM REPORT: CPT

## 2022-04-25 PROCEDURE — 71045 X-RAY EXAM CHEST 1 VIEW: CPT | Mod: 26

## 2022-04-25 RX ORDER — SODIUM CHLORIDE 9 MG/ML
1000 INJECTION, SOLUTION INTRAVENOUS ONCE
Refills: 0 | Status: DISCONTINUED | OUTPATIENT
Start: 2022-04-25 | End: 2022-04-29

## 2022-04-25 RX ORDER — ACETAMINOPHEN 500 MG
650 TABLET ORAL ONCE
Refills: 0 | Status: DISCONTINUED | OUTPATIENT
Start: 2022-04-25 | End: 2022-04-29

## 2022-04-25 NOTE — ED PROVIDER NOTE - CLINICAL SUMMARY MEDICAL DECISION MAKING FREE TEXT BOX
26F p/w cough/chest pain/RLQ pain in setting of being covid+ for 2 days. VSS in ED. Lungs ctab, RLQ TTP, enlarged tonsils, no leg swelling  Likely upper resp viral symptoms 2/2 covid, however will need to eval for acute appendcitis given RLQ pain. Unlikely ACS but will eval chest pain with ecg and reassess symptoms  Plan: abdominal labs, ivf, CT A/P, ua/ucx, reassess symptoms

## 2022-04-25 NOTE — ED PROVIDER NOTE - ATTENDING CONTRIBUTION TO CARE
Attending MD Jurado:  I personally have seen and examined this patient. I have performed a substantive portion of the visit including all aspects of the medical decision making.  Resident note reviewed and agree on plan of care and except where noted.        26F with several days of body aches, chest pain and fever, covid positive today, vaccinated x 2. Also c/o RLQ abd pain that initially started only with coughing but is now constant. Exam is notable for mild ttp RLQ, no peritoneal signs. RA sat 100%, mild covid-19 illness at this time. Will obtain CT a/p to rule out appendicitis, labs, reassessment.         *The above represents an initial assessment/impression. Please refer to progress notes for potential changes in patient clinical course*

## 2022-04-25 NOTE — ED ADULT NURSE REASSESSMENT NOTE - NS ED NURSE REASSESS COMMENT FT1
Pt states that she had positive home test, which was positive, and then had urgent care rapid which was negative yesterday. Pt states she has sharp chest pain.

## 2022-04-25 NOTE — ED PROVIDER NOTE - PROGRESS NOTE DETAILS
Paresh CORBIN (PGY-2)  pt reassessed at bedside, feeling better. abd mostly NT now. informed to f/u with gyn for her ovarian cyst and f/u with pcp for covid symptoms. tolerating PO w/o difficulty and well appearing otherwise. will d/c to home with return precautions

## 2022-04-25 NOTE — ED PROVIDER NOTE - PHYSICAL EXAMINATION
Physical Exam:  Gen:  awake alert   HEENT: no tongue elevation, enlarged tonsils b/l w/o exudates, uvular midline, normal conjunctiva, oral mucosa moist  Lung: CTAB, no respiratory distress, no wheezes/rhonchi/rales B/L, speaking in full sentences  CV: RRR  Abd: soft, NT, ND, no guarding, no rigidity, no rebound tenderness, no CVA tenderness   Pelvic (chaperone RN Conaty): no external lesions, os closed, R adnexal TTP, no CMT or L adnexal TTP  MSK: no visible deformities  Skin: Warm, well perfused, no rash, no leg swelling  ~Nathaniel Yoder MD (PGY-2) Physical Exam:  Gen:  awake alert   HEENT: no tongue elevation, enlarged tonsils b/l w/o exudates, uvular midline, normal conjunctiva, oral mucosa moist  Lung: CTAB, no respiratory distress, no wheezes/rhonchi/rales B/L, speaking in full sentences  CV: RRR  Abd: soft, NT, ND, no guarding, no rigidity, no rebound tenderness, no CVA tenderness   Pelvic (chaperone RN Conaty): no external lesions, os closed, minimal R adnexal TTP, no CMT or L adnexal TTP  MSK: no visible deformities  Skin: Warm, well perfused, no rash, no leg swelling  ~Nathaniel Yoder MD (PGY-2)

## 2022-04-25 NOTE — ED PROVIDER NOTE - PATIENT PORTAL LINK FT
You can access the FollowMyHealth Patient Portal offered by [facility] by registering at the following website: [email or web address]. By joining [employer]’s FollowMyHealth portal, you will also be able to view your health information using other applications (apps) compatible with our system.

## 2022-04-25 NOTE — ED PROVIDER NOTE - OBJECTIVE STATEMENT
26F no significant PMH p/w 26F no significant PMH p/w abd pain and covid symptoms. Has had dry cough, substernal chest pain for past 2 days. Vaccinated x2, last in 11/2021 mrna. Also states since last night she has had RLQ pain w/o any n/v; this abd pain has persisted and currently 4-5/10. No f/c, SOB, urinary symptoms, leg swelling, vag bleeding/discharge, or family hx of cardiac disease. LMP early april

## 2022-04-25 NOTE — ED PROVIDER NOTE - NSFOLLOWUPINSTRUCTIONS_ED_ALL_ED_FT
Follow up with your primary care doctor within the next 2-3 days for further evaluation of your symptoms. You should also follow up with your Gynecologist within the next 2-3 days for evaluation of your ovarian cyst    Please take over the counter pain medications such as Tylenol (650mg every 4 hours) for pain and fevers. Drink plenty of fluids to stay hydrated. Continue to isolate per CDC guidelines    If you develop any new or worsening symptoms you need to return immediately to the emergency department. If you experience any of the following please come right back to the emergency room: severe nausea and vomiting with inability to tolerate eating, severe worsening of your pain, a new fever, new bleeding in stool or vomit, confusion, new numbness or weakness, passing out

## 2022-04-25 NOTE — ED PROVIDER NOTE - NS ED ROS FT
CONST: no fevers, no chills  ENT: no sore throat  CV: +chest pain, no leg swelling  RESP: no shortness of breath, +cough  ABD: +abdominal pain, no nausea, no vomiting, no diarrhea  : no dysuria, no flank pain, no hematuria  MSK: no back pain, no extremity pain  SKIN:  no rash

## 2022-04-26 VITALS
RESPIRATION RATE: 18 BRPM | OXYGEN SATURATION: 99 % | SYSTOLIC BLOOD PRESSURE: 111 MMHG | HEART RATE: 80 BPM | DIASTOLIC BLOOD PRESSURE: 57 MMHG

## 2022-04-26 PROCEDURE — 80053 COMPREHEN METABOLIC PANEL: CPT

## 2022-04-26 PROCEDURE — 84295 ASSAY OF SERUM SODIUM: CPT

## 2022-04-26 PROCEDURE — 99285 EMERGENCY DEPT VISIT HI MDM: CPT | Mod: 25

## 2022-04-26 PROCEDURE — 83690 ASSAY OF LIPASE: CPT

## 2022-04-26 PROCEDURE — 85025 COMPLETE CBC W/AUTO DIFF WBC: CPT

## 2022-04-26 PROCEDURE — 82803 BLOOD GASES ANY COMBINATION: CPT

## 2022-04-26 PROCEDURE — 36415 COLL VENOUS BLD VENIPUNCTURE: CPT

## 2022-04-26 PROCEDURE — 81001 URINALYSIS AUTO W/SCOPE: CPT

## 2022-04-26 PROCEDURE — 74177 CT ABD & PELVIS W/CONTRAST: CPT | Mod: MA

## 2022-04-26 PROCEDURE — 84702 CHORIONIC GONADOTROPIN TEST: CPT

## 2022-04-26 PROCEDURE — 82565 ASSAY OF CREATININE: CPT

## 2022-04-26 PROCEDURE — 85014 HEMATOCRIT: CPT

## 2022-04-26 PROCEDURE — 82330 ASSAY OF CALCIUM: CPT

## 2022-04-26 PROCEDURE — 71045 X-RAY EXAM CHEST 1 VIEW: CPT

## 2022-04-26 PROCEDURE — 74177 CT ABD & PELVIS W/CONTRAST: CPT | Mod: 26,MA

## 2022-04-26 PROCEDURE — 82947 ASSAY GLUCOSE BLOOD QUANT: CPT

## 2022-04-26 PROCEDURE — 83605 ASSAY OF LACTIC ACID: CPT

## 2022-04-26 PROCEDURE — 85018 HEMOGLOBIN: CPT

## 2022-04-26 PROCEDURE — 84132 ASSAY OF SERUM POTASSIUM: CPT

## 2022-04-26 PROCEDURE — 82435 ASSAY OF BLOOD CHLORIDE: CPT

## 2022-04-26 NOTE — ED ADULT NURSE NOTE - OBJECTIVE STATEMENT
26y F presents with covid complaints. pt states she has been having a dry cough, abd pain and chest pain. pt states she tested covid positive on a rapid test. IVL inserted and labs sent. pelvic exam performed and pt TTP RLQ. CT ordered. ambulatory sat 100%. pt aware of plan for wait for CT results and lab work, call bell in place.

## 2022-05-27 NOTE — ED ADULT NURSE NOTE - NURSING GU BLADDER
History of Present Illness:   59 y/o M PMHx of alcoholic cirrhosis (currently taking Lasix, Metolazone and Eplerenone) EtOH abuse (last drink was reportedly on 10/31/2021), esophageal varices, left pleural effusion, chronic partial portal vein thrombosis (on Apixaban) admitted from 5/7 - 5/12/2022 with decompensated alcoholic cirrhosis complicated by spontaneous bacterial peritonitis. The patient underwent a diagnostic and therapeutic tap of ~7.7L, and started on IV abx with Ceftriaxone 2g IVP daily. The patient's symptoms of abdominal distention and discomfort improved at that time, remained afebrile, with fluid analysis consistent with SBP.  The patient returns to the [facility] for further evaluation and management of subjective chills/fever, worsening back pain, abdominal pain, and distention associated with increasing ascites despite escalation of diuretics recently. In the ED GI and Interventional Radiology were consulted.  Labs => PLT 94, INR 2.18, NH3 64, Na 126, K 3.1, Cl 85, HCO3 32, Glu 173, TPro 5.7, Alb 2.6, TBili 3.2, AST 72. In the ED the patient was given Potassium chloride 40mEq po x 1    5.27: s/p paracentesis 6000cc drained, no abd pain            REVIEW OF SYSTEMS:    CONSTITUTIONAL: No weakness, No fevers or chills  ENT: No ear ache, No sorethroat  NECK: No pain, No stiffness  RESPIRATORY: No cough, No wheezing, No hemoptysis; No dyspnea  CARDIOVASCULAR: No chest pain, No palpitations  GASTROINTESTINAL: No abd pain, No nausea, No vomiting, No hematemesis, No diarrhea or constipation. No melena, No hematochezia.  GENITOURINARY: No dysuria, No  hematuria  NEUROLOGICAL: No diplopia, No paresthesia, No motor dysfunction  MUSCULOSKELETAL: No arthralgia, No myalgia  SKIN: No rashes, or lesions   PSYCH: no anxiety, no suicidal ideation    All other review of systems is negative unless indicated above    Vital Signs Last 24 Hrs  T(C): 37.3 (27 May 2022 07:53), Max: 37.3 (27 May 2022 07:53)  T(F): 99.2 (27 May 2022 07:53), Max: 99.2 (27 May 2022 07:53)  HR: 80 (27 May 2022 07:53) (79 - 80)  BP: 102/61 (27 May 2022 07:53) (100/66 - 133/72)  BP(mean): --  RR: 18 (27 May 2022 07:53) (18 - 18)  SpO2: 96% (27 May 2022 07:53) (96% - 100%)    PHYSICAL EXAM:    GENERAL: NAD  HEENT:  NC/AT, EOMI, PERRLA, No scleral icterus, Moist mucous membranes  NECK: Supple, No JVD  CNS:  Alert & Oriented X3, Motor Strength 5/5 B/L upper and lower extremities; DTRs 2+ intact   LUNG: Normal Breath sounds, Clear to auscultation bilaterally, No rales, No rhonchi, No wheezing  HEART: RRR; No murmurs, No rubs  ABDOMEN: +BS, ST/NT, distended   GENITOURINARY: Voiding, Bladder not distended  EXTREMITIES:  2+ Peripheral Pulses, No clubbing, No cyanosis, No tibial edema  MUSCULOSKELTAL: Joints normal ROM, No TTP, No effusion  SKIN: no rashes  RECTAL: deferred, not indicated  BREAST: deferred                          13.1   3.96  )-----------( 94       ( 26 May 2022 12:39 )             37.8     05-26    126<L>  |  85<L>  |  23  ----------------------------<  173<H>  3.1<L>   |  32<H>  |  1.00    Ca    8.3<L>      26 May 2022 12:39    TPro  5.7<L>  /  Alb  2.6<L>  /  TBili  3.2<H>  /  DBili  x   /  AST  72<H>  /  ALT  41  /  AlkPhos  116  05-26    Vancomycin levels:   Cultures:     MEDICATIONS  (STANDING):  apixaban 5 milliGRAM(s) Oral every 12 hours  cholecalciferol 2000 Unit(s) Oral daily  furosemide    Tablet 80 milliGRAM(s) Oral two times a day  metolazone 2.5 milliGRAM(s) Oral daily    MEDICATIONS  (PRN):  acetaminophen     Tablet .. 650 milliGRAM(s) Oral every 6 hours PRN Temp greater or equal to 38C (100.4F), Mild Pain (1 - 3)  aluminum hydroxide/magnesium hydroxide/simethicone Suspension 30 milliLiter(s) Oral every 4 hours PRN Dyspepsia  melatonin 3 milliGRAM(s) Oral at bedtime PRN Insomnia  ondansetron Injectable 4 milliGRAM(s) IV Push every 8 hours PRN Nausea and/or Vomiting      all labs reviewed  all imaging reviewed          1. Large Ascites due to Etoh cirrhosis:   s/p paracentesis, 6000cc drained  f/u cell count, f/u fluid culture   no clinical signs of SBP    2. Hyponatremia:   likely due to cirrhosis and diuretic    3. Portal vein thrombosis  c/w Apixaban           GI    s/p paracentesis  no abd pain or complaints  fluid analysis pending    ROS  As above  Otherwise unremarkable, all systems reviewed    PE:  Vital Signs Last 24 Hrs  T(C): 36.7 (27 May 2022 16:07), Max: 37.3 (27 May 2022 07:53)  T(F): 98.1 (27 May 2022 16:07), Max: 99.2 (27 May 2022 07:53)  HR: 82 (27 May 2022 16:07) (80 - 82)  BP: 102/68 (27 May 2022 16:07) (102/61 - 102/68)  BP(mean): --  RR: 18 (27 May 2022 16:07) (18 - 18)  SpO2: 94% (27 May 2022 16:07) (94% - 96%)    Constitutional: NAD, well-developed, A+Ox3  Anicteric   no asterixis  Respiratory: CTABL, breathing comfortably  Cardiovascular: S1 and S2, RRR  Gastrointestinal: +BS, soft, non tender, less distended, no mass  Extremities: warm, well perfused, 1+ edema  Psychiatric: Normal mood, normal affect  Neuro: moves all extremities, grossly intact  Skin: No rashes or lesions    LABS:                        10.6   7.11  )-----------( 60       ( 27 May 2022 18:12 )             29.7     05-27    124<L>  |  84<L>  |  26<H>  ----------------------------<  158<H>  2.9<LL>   |  30  |  0.96    Ca    7.7<L>      27 May 2022 18:12    TPro  4.8<L>  /  Alb  2.3<L>  /  TBili  3.4<H>  /  DBili  x   /  AST  57<H>  /  ALT  33  /  AlkPhos  97  05-27                        non-distended

## 2022-08-31 NOTE — ED PROVIDER NOTE - IV ALTEPLASE ADMIN OUTSIDE HIDDEN
Last visit: 6/29/22  Last Med refill: 9/2/21  Does patient have enough medication for 72 hours:  Next Visit Date:  No future appointments.     Health Maintenance   Topic Date Due    HIV screen  Never done    Hepatitis C screen  Never done    Diabetes screen  Never done    Lipids  12/11/2019    Flu vaccine (1) 09/01/2022    Depression Monitoring  06/29/2023    DTaP/Tdap/Td vaccine (2 - Td or Tdap) 11/22/2030    COVID-19 Vaccine  Completed    Hepatitis A vaccine  Aged Out    Hepatitis B vaccine  Aged Out    Hib vaccine  Aged Out    Meningococcal (ACWY) vaccine  Aged Out    Pneumococcal 0-64 years Vaccine  Aged Out       No results found for: LABA1C          ( goal A1C is < 7)   No results found for: LABMICR  LDL Calculated (mg/dL)   Date Value   12/11/2014 111   07/05/2013 134       (goal LDL is <100)   AST (U/L)   Date Value   12/01/2016 17     ALT (U/L)   Date Value   12/01/2016 20     BUN (mg/dL)   Date Value   12/01/2016 13     BP Readings from Last 3 Encounters:   06/29/22 132/68   12/22/21 112/82   09/02/21 106/70          (goal 120/80)    All Future Testing planned in [facility]  Lab Frequency Next Occurrence   XR CERVICAL SPINE (2-3 VIEWS) Once 01/06/2022   CT HEAD WO CONTRAST Once 01/06/2022               Patient Active Problem List:     Migraine     Acne     Hypercholesteremia     Atrial septal defect     SVT (supraventricular tachycardia) (HCC)     Depression     Anxiety     Seasonal allergies     Chronic shoulder pain     Sprain, knee     Cervical strain     Chronic rhinitis
show

## 2022-11-30 ENCOUNTER — NON-APPOINTMENT (OUTPATIENT)
Age: 27
End: 2022-11-30

## 2023-02-04 ENCOUNTER — EMERGENCY (EMERGENCY)
Facility: HOSPITAL | Age: 28
LOS: 1 days | Discharge: HOME | End: 2023-02-04
Attending: STUDENT IN AN ORGANIZED HEALTH CARE EDUCATION/TRAINING PROGRAM
Payer: MEDICAID

## 2023-02-04 VITALS
DIASTOLIC BLOOD PRESSURE: 94 MMHG | OXYGEN SATURATION: 100 % | RESPIRATION RATE: 19 BRPM | SYSTOLIC BLOOD PRESSURE: 108 MMHG | TEMPERATURE: 100 F | HEART RATE: 96 BPM

## 2023-02-04 VITALS
HEIGHT: 63 IN | HEART RATE: 98 BPM | RESPIRATION RATE: 18 BRPM | TEMPERATURE: 99 F | DIASTOLIC BLOOD PRESSURE: 68 MMHG | OXYGEN SATURATION: 98 % | SYSTOLIC BLOOD PRESSURE: 115 MMHG | WEIGHT: 154.1 LBS

## 2023-02-04 LAB
FLUAV AG NPH QL: SIGNIFICANT CHANGE UP
FLUBV AG NPH QL: SIGNIFICANT CHANGE UP
RSV RNA NPH QL NAA+NON-PROBE: SIGNIFICANT CHANGE UP
SARS-COV-2 RNA SPEC QL NAA+PROBE: SIGNIFICANT CHANGE UP

## 2023-02-04 PROCEDURE — 99284 EMERGENCY DEPT VISIT MOD MDM: CPT

## 2023-02-04 PROCEDURE — 87637 SARSCOV2&INF A&B&RSV AMP PRB: CPT

## 2023-02-04 PROCEDURE — 71046 X-RAY EXAM CHEST 2 VIEWS: CPT

## 2023-02-04 PROCEDURE — 71046 X-RAY EXAM CHEST 2 VIEWS: CPT | Mod: 26

## 2023-02-04 PROCEDURE — 99283 EMERGENCY DEPT VISIT LOW MDM: CPT | Mod: 25

## 2023-02-04 RX ORDER — ACETAMINOPHEN 500 MG
975 TABLET ORAL ONCE
Refills: 0 | Status: COMPLETED | OUTPATIENT
Start: 2023-02-04 | End: 2023-02-04

## 2023-02-04 RX ORDER — DEXAMETHASONE 0.5 MG/5ML
10 ELIXIR ORAL ONCE
Refills: 0 | Status: COMPLETED | OUTPATIENT
Start: 2023-02-04 | End: 2023-02-04

## 2023-02-04 RX ADMIN — Medication 10 MILLIGRAM(S): at 11:25

## 2023-02-04 RX ADMIN — Medication 975 MILLIGRAM(S): at 10:26

## 2023-02-04 NOTE — ED PROVIDER NOTE - NS ED ROS FT
CONSTITUTIONAL: +fever/chills  EYES/ENT: No visual changes;  No vertigo, +sore throat   NECK: No pain or stiffness  RESPIRATORY: No cough, wheezing, hemoptysis; No shortness of breath  CARDIOVASCULAR: No chest pain or palpitations  GASTROINTESTINAL: No abdominal or epigastric pain. No nausea, vomiting, or hematemesis; No diarrhea or constipation. No melena or hematochezia.  GENITOURINARY: No dysuria, frequency or hematuria  NEUROLOGICAL: No numbness or weakness, +headache  SKIN: No itching, rashes

## 2023-02-04 NOTE — ED PROVIDER NOTE - CLINICAL SUMMARY MEDICAL DECISION MAKING FREE TEXT BOX
27 year old female, previously healthy, presents with sore throat with flu-like symptoms including headache and subjective fever/chills. COVID vaccinated. Will swab for flu, COVID, RVP, obtain CXR and basic labs, and provide tylenol. Large tonsils on exam but symmetric and uvula midline without exudates, so low suspicion for PTA. Airway patent. 27 year old female, previously healthy, presents with sore throat with flu-like symptoms including headache and subjective fever/chills. COVID vaccinated. Will swab for flu, COVID, obtain CXR, and provide tylenol. Large tonsils on exam but symmetric and uvula midline without exudates, so low suspicion for PTA. Airway patent, no stridor.

## 2023-02-04 NOTE — ED PROVIDER NOTE - PROGRESS NOTE DETAILS
Azam Juarez PGY2: pt reassessed. pain improved with medication. results discussed. OP f/u discussed. Return precautions discussed, verbal understanding demonstrated, all questions answered.

## 2023-02-04 NOTE — ED PROVIDER NOTE - NSFOLLOWUPINSTRUCTIONS_ED_ALL_ED_FT
Upper Respiratory Infection, Adult  An upper respiratory infection (URI) is a common viral infection of the nose, throat, and upper air passages that lead to the lungs. The most common type of URI is the common cold. URIs usually get better on their own, without medical treatment.    What are the causes?  A URI is caused by a virus. You may catch a virus by: Breathing in droplets from an infected person's cough or sneeze. Touching something that has been exposed to the virus (contaminated) and then touching your mouth, nose, or eyes.    What increases the risk? You are more likely to get a URI if:  You are very young or very old.It is kalyn or winter. You have close contact with others, such as at a , school, or health care facility. You smoke. You have long-term (chronic) heart or lung disease. You have a weakened disease-fighting (immune) system. You have nasal allergies or asthma. You are experiencing a lot of stress. You work in an area that has poor air circulation. You have poor nutrition.    What are the signs or symptoms? A URI usually involves some of the following symptoms:  Runny or stuffy (congested) nose. Sneezing. Cough. Sore throat. Headache. Fatigue. Fever. Loss of appetite. Pain in your forehead, behind your eyes, and over your cheekbones (sinus pain). Muscle aches. Redness or irritation of the eyes. Pressure in the ears or face.    How is this diagnosed?  This condition may be diagnosed based on your medical history and symptoms, and a physical exam. Your health care provider may use a cotton swab to take a mucus sample from your nose (nasal swab). This sample can be tested to determine what virus is causing the illness.    How is this treated?  URIs usually get better on their own within 7–10 days. You can take steps at home to relieve your symptoms. Medicines cannot cure URIs, but your health care provider may recommend certain medicines to help relieve symptoms, such as: Over-the-counter cold medicines. Cough suppressants. Coughing is a type of defense against infection that helps to clear the respiratory system, so take these medicines only as recommended by your health care provider. Fever-reducing medicines.    Follow these instructions at home:  Activity: Rest as needed. If you have a fever, stay home from work or school until your fever is gone or until your health care provider says you are no longer contagious. Your health care provider may have you wear a face mask to prevent your infection from spreading.  Relieving symptoms   Gargle with a salt-water mixture 3–4 times a day or as needed. To make a salt-water mixture, completely dissolve ½–1 tsp of salt in 1 cup of warm water. Use a cool-mist humidifier to add moisture to the air. This can help you breathe more easily.  Eating and drinking   Drink enough fluid to keep your urine pale yellow. Eat soups and other clear broths.    General instructions:  Take over-the-counter and prescription medicines only as told by your health care provider. These include cold medicines, fever reducers, and cough suppressants. Do not use any products that contain nicotine or tobacco, such as cigarettes and e-cigarettes. If you need help quitting, ask your health care provider. Stay away from secondhand smoke. Stay up to date on all immunizations, including the yearly (annual) flu vaccine. Keep all follow-up visits as told by your health care provider. This is important.  How to prevent the spread of infection to others:  URIs can be passed from person to person (are contagious). To prevent the infection from spreading:  Wash your hands often with soap and water. If soap and water are not available, use hand . Avoid touching your mouth, face, eyes, or nose. Cough or sneeze into a tissue or your sleeve or elbow instead of into your hand or into the air.    Contact a health care provider if:  You are getting worse instead of better. You have a fever or chills. Your mucus is brown or red. You have yellow or brown discharge coming from your nose. You have pain in your face, especially when you bend forward. You have swollen neck glands. You have pain while swallowing. You have white areas in the back of your throat.  Get help right away if:  You have shortness of breath that gets worse. You have severe or persistent:  Headache. Ear pain. Sinus pain. Chest pain. You have chronic lung disease along with any of the following:  Wheezing. Prolonged cough. Coughing up blood. A change in your usual mucus. You have a stiff neck. You have changes in your: Vision. Hearing. Thinking. Mood.     Summary  An upper respiratory infection (URI) is a common infection of the nose, throat, and upper air passages that lead to the lungs. A URI is caused by a virus. URIs usually get better on their own within 7–10 days. Medicines cannot cure URIs, but your health care provider may recommend certain medicines to help relieve symptoms. This information is not intended to replace advice given to you by your health care provider. Make sure you discuss any questions you have with your health care provider.

## 2023-02-04 NOTE — ED PROVIDER NOTE - ATTENDING CONTRIBUTION TO CARE
27 F w/ no pmh here w/ sore throat, pt w/ no cp, no sob, no nausea no vomiting. pt w/ no lightheadedness.  intermittent dry cough, pt state her mother has cancer, and that is why she came to the hospital, was unable to tolerate azithromycin due to stomach upset, pt states neg flu covid and strop testing on Wednesday. On exam, pt is awake and alert in no distress, clear lungs soft abdomen, no lower leg edema, plan for swab and xray, posterior pharynx w/ no exudate, has bilateral tonsilar enlargement, no stiffness no stridor, no trismus, b/l cervical lymphadenopathy, pt w/ supple neck handling her oral secretions, plan for symptomatic treatment likely pharyngitis viral, centor low risk,

## 2023-02-04 NOTE — ED PROVIDER NOTE - OBJECTIVE STATEMENT
27 year old female without significant medical history presents for sore throat that began 3 days ago. She was able to work that day, but it got worse so she went to her PCP who swabbed her for COVID, flu, and strep and prescribed her a zpak (no results back yet). She started the zpak, but states it upset her stomach so she stopped taking it. Last night, she developed a subjective fever with chills, frontal headache, and sweats, which improved with a shower and tylenol. This morning her symptoms remained and she felt it was more difficult to swallow than usual, so she came to the ED. She denies shortness of breath, chest pain, cough, nausea/vomiting/diarrhea, or lightheadedness/dizziness. She has been able to eat and drink since her symptoms began. She denies being sexually active and denies any possibility of being pregnant. Her LMP was at the end of January and was normal for her. She states she has been told she has large tonsils, but has never had anything done for them.

## 2023-02-04 NOTE — ED ADULT NURSE NOTE - OBJECTIVE STATEMENT
28 y/o A&Ox3 w/ no significant PMH presents to ED complaining of sore throat. Pt states that sore throat started Wednesday and she went to PCP on Thursday and is awaiting swab results. Pt is currently on Z-pack and has taken 3 doses. Pt also endorses chills, headache and lightheadedness. Pt states she believes she had a fever but last took Tylenol at 1:00am. Pt LMP was 1/22/23. Pt denies N/V/D, body aches, sob, and chest pain. Pt plan of care explained and safety and comfort provided.

## 2023-02-04 NOTE — ED PROVIDER NOTE - PATIENT PORTAL LINK FT
You can access the FollowMyHealth Patient Portal offered by [facility] by registering at the following website: [email or web address]. By joining [employer]’s FollowMyHealth portal, you will also be able to view your health information using other applications (apps) compatible with our system.

## 2023-02-04 NOTE — ED PROVIDER NOTE - PHYSICAL EXAMINATION
GENERAL: Laying comfortably, NAD  EYES: EOMI, no scleral icterus, PERRLA  NECK: No JVD, no lymphadenopathy, full ROM  Tonsillar hypertrophy without exudates or erythema  Uvula midline without exudates or overlying erythema  LUNG: Clear to auscultation bilaterally; No wheeze, crackles or rhonci  HEART: Regular rate and rhythm; No murmur, rubs, or gallops  ABDOMEN: Soft, Nontender, Nondistended  EXTREMITIES:  No LE edema, No clubbing, cyanosis, or edema  NEUROLOGY: A&Ox3, moves all extremities well  SKIN: No rashes or lesions GENERAL: Laying comfortably, NAD  EYES: EOMI, no scleral icterus, PERRLA  NECK: No JVD, no lymphadenopathy, full ROM  Tonsillar hypertrophy without exudates or erythema  Uvula midline without exudates or overlying erythema  No stridor  LUNG: Clear to auscultation bilaterally; No wheeze, crackles or rhonci  HEART: Regular rate and rhythm; No murmur, rubs, or gallops  ABDOMEN: Soft, Nontender, Nondistended  EXTREMITIES:  No LE edema, No clubbing, cyanosis, or edema  NEUROLOGY: A&Ox3, moves all extremities well  SKIN: No rashes or lesions GENERAL: Laying comfortably, NAD  EYES: EOMI, no scleral icterus, PERRLA  NECK: No JVD, no lymphadenopathy, full ROM  +Tonsillar hypertrophy without exudates or erythema  Uvula midline, No stridor  LUNG: Clear to auscultation bilaterally; No wheeze, crackles or rhonci  HEART: Regular rate and rhythm; No murmur, rubs, or gallops  ABDOMEN: Soft, Nontender, Nondistended  EXTREMITIES:  No LE edema, No clubbing, cyanosis, or edema  NEUROLOGY: A&Ox3, moves all extremities well  SKIN: No rashes or lesions

## 2023-03-17 NOTE — ED PROVIDER NOTE - MOUTH/THROAT [-], MLM
What Type Of Note Output Would You Prefer (Optional)?: Standard Output
How Severe Are Your Spot(S)?: moderate
Have Your Spot(S) Been Treated In The Past?: has not been treated
Hpi Title: Evaluation of Skin Lesions
no hoarseness/no difficulty in swallowing/no pain

## 2023-09-12 ENCOUNTER — NON-APPOINTMENT (OUTPATIENT)
Age: 28
End: 2023-09-12

## 2023-10-09 ENCOUNTER — NON-APPOINTMENT (OUTPATIENT)
Age: 28
End: 2023-10-09

## 2023-11-02 NOTE — ED PROVIDER NOTE - MDM ORDERS SUBMITTED SELECTION
----- Message from Arnold Sawyer sent at 11/2/2023  3:12 PM CDT -----  Contact: Self  Type:  RX Refill Request    Who Called:  Patient  Refill or New Rx:  Refill  RX Name and Strength:  cloNIDine (CATAPRES) 0.1 MG tablet  How is the patient currently taking it? (ex. 1XDay):  as directed  Is this a 30 day or 90 day RX:  30  Preferred Pharmacy with phone number:    [id number] - [place] - [address] [facility] [address]  [address] [address]  Phone: [phone] Fax: [phone]      Local or Mail Order:  Local  Ordering Provider:  Tosha  Best Call Back Number:  [phone]   Additional Information:  States she is out currently. States bp 180/83      
Clonidine was sent in today for the patient   
Labs/Medications

## 2023-11-16 ENCOUNTER — APPOINTMENT (OUTPATIENT)
Dept: ORTHOPEDIC SURGERY | Facility: CLINIC | Age: 28
End: 2023-11-16

## 2023-12-07 ENCOUNTER — APPOINTMENT (OUTPATIENT)
Dept: SURGICAL ONCOLOGY | Facility: CLINIC | Age: 28
End: 2023-12-07
Payer: COMMERCIAL

## 2023-12-07 ENCOUNTER — NON-APPOINTMENT (OUTPATIENT)
Age: 28
End: 2023-12-07

## 2023-12-07 VITALS
OXYGEN SATURATION: 98 % | HEART RATE: 66 BPM | DIASTOLIC BLOOD PRESSURE: 79 MMHG | SYSTOLIC BLOOD PRESSURE: 113 MMHG | RESPIRATION RATE: 16 BRPM | BODY MASS INDEX: 26.98 KG/M2 | WEIGHT: 160 LBS | HEIGHT: 64.5 IN

## 2023-12-07 DIAGNOSIS — Z91.89 OTHER SPECIFIED PERSONAL RISK FACTORS, NOT ELSEWHERE CLASSIFIED: ICD-10-CM

## 2023-12-07 PROCEDURE — 99205 OFFICE O/P NEW HI 60 MIN: CPT

## 2023-12-18 ENCOUNTER — APPOINTMENT (OUTPATIENT)
Dept: RADIOLOGY | Facility: CLINIC | Age: 28
End: 2023-12-18

## 2023-12-18 ENCOUNTER — OUTPATIENT (OUTPATIENT)
Dept: OUTPATIENT SERVICES | Facility: HOSPITAL | Age: 28
LOS: 1 days | End: 2023-12-18
Payer: COMMERCIAL

## 2023-12-18 ENCOUNTER — APPOINTMENT (OUTPATIENT)
Dept: MRI IMAGING | Facility: CLINIC | Age: 28
End: 2023-12-18
Payer: COMMERCIAL

## 2023-12-18 DIAGNOSIS — Z91.89 OTHER SPECIFIED PERSONAL RISK FACTORS, NOT ELSEWHERE CLASSIFIED: ICD-10-CM

## 2023-12-18 PROCEDURE — A9585: CPT

## 2023-12-18 PROCEDURE — C8937: CPT

## 2023-12-18 PROCEDURE — C8908: CPT

## 2023-12-18 PROCEDURE — 77049 MRI BREAST C-+ W/CAD BI: CPT | Mod: 26

## 2023-12-19 ENCOUNTER — RESULT REVIEW (OUTPATIENT)
Age: 28
End: 2023-12-19

## 2023-12-19 ENCOUNTER — OUTPATIENT (OUTPATIENT)
Dept: OUTPATIENT SERVICES | Facility: HOSPITAL | Age: 28
LOS: 1 days | End: 2023-12-19
Payer: COMMERCIAL

## 2023-12-19 DIAGNOSIS — C80.1 MALIGNANT (PRIMARY) NEOPLASM, UNSPECIFIED: ICD-10-CM

## 2023-12-19 PROCEDURE — 88321 CONSLTJ&REPRT SLD PREP ELSWR: CPT

## 2023-12-20 LAB
SURGICAL PATHOLOGY STUDY: SIGNIFICANT CHANGE UP
SURGICAL PATHOLOGY STUDY: SIGNIFICANT CHANGE UP

## 2024-01-12 ENCOUNTER — APPOINTMENT (OUTPATIENT)
Dept: PLASTIC SURGERY | Facility: CLINIC | Age: 29
End: 2024-01-12

## 2024-01-12 ENCOUNTER — APPOINTMENT (OUTPATIENT)
Dept: PLASTIC SURGERY | Facility: CLINIC | Age: 29
End: 2024-01-12
Payer: COMMERCIAL

## 2024-01-12 PROCEDURE — XXXXX: CPT | Mod: 1L

## 2024-01-17 ENCOUNTER — APPOINTMENT (OUTPATIENT)
Dept: PLASTIC SURGERY | Facility: CLINIC | Age: 29
End: 2024-01-17
Payer: COMMERCIAL

## 2024-01-17 PROCEDURE — XXXXX: CPT | Mod: 1L

## 2024-01-18 ENCOUNTER — OUTPATIENT (OUTPATIENT)
Dept: OUTPATIENT SERVICES | Facility: HOSPITAL | Age: 29
LOS: 1 days | End: 2024-01-18
Payer: COMMERCIAL

## 2024-01-18 VITALS
TEMPERATURE: 98 F | OXYGEN SATURATION: 98 % | HEIGHT: 66 IN | RESPIRATION RATE: 16 BRPM | DIASTOLIC BLOOD PRESSURE: 69 MMHG | SYSTOLIC BLOOD PRESSURE: 106 MMHG | HEART RATE: 60 BPM | WEIGHT: 167.11 LBS

## 2024-01-18 DIAGNOSIS — D24.1 BENIGN NEOPLASM OF RIGHT BREAST: ICD-10-CM

## 2024-01-18 DIAGNOSIS — Z98.890 OTHER SPECIFIED POSTPROCEDURAL STATES: Chronic | ICD-10-CM

## 2024-01-18 DIAGNOSIS — Z01.818 ENCOUNTER FOR OTHER PREPROCEDURAL EXAMINATION: ICD-10-CM

## 2024-01-18 LAB
HCT VFR BLD CALC: 41.6 % — SIGNIFICANT CHANGE UP (ref 34.5–45)
HGB BLD-MCNC: 14.1 G/DL — SIGNIFICANT CHANGE UP (ref 11.5–15.5)
MCHC RBC-ENTMCNC: 30.9 PG — SIGNIFICANT CHANGE UP (ref 27–34)
MCHC RBC-ENTMCNC: 33.9 GM/DL — SIGNIFICANT CHANGE UP (ref 32–36)
MCV RBC AUTO: 91.2 FL — SIGNIFICANT CHANGE UP (ref 80–100)
NRBC # BLD: 0 /100 WBCS — SIGNIFICANT CHANGE UP (ref 0–0)
PLATELET # BLD AUTO: 338 K/UL — SIGNIFICANT CHANGE UP (ref 150–400)
RBC # BLD: 4.56 M/UL — SIGNIFICANT CHANGE UP (ref 3.8–5.2)
RBC # FLD: 13.7 % — SIGNIFICANT CHANGE UP (ref 10.3–14.5)
WBC # BLD: 7.63 K/UL — SIGNIFICANT CHANGE UP (ref 3.8–10.5)
WBC # FLD AUTO: 7.63 K/UL — SIGNIFICANT CHANGE UP (ref 3.8–10.5)

## 2024-01-18 PROCEDURE — 85027 COMPLETE CBC AUTOMATED: CPT

## 2024-01-18 PROCEDURE — G0463: CPT

## 2024-01-18 PROCEDURE — 36415 COLL VENOUS BLD VENIPUNCTURE: CPT

## 2024-01-18 NOTE — H&P PST ADULT - ATTENDING COMMENTS
28-year-old lady with an elevated breast cancer risk score pleasant enlarging left breast fibroadenoma.    Her mother  of metastatic breast cancer.    Patient is scheduled for excision, with preoperative localization.    oncologic diagnosis, surgical approach, risks, benefits and possible surgical outcomes reviewed in detail, all questions answered.    Consent on chart

## 2024-01-18 NOTE — H&P PST ADULT - HISTORY OF PRESENT ILLNESS
This is a 29 y/o female who presents to PST with pre-operative diagnosis of benign neoplasm of right breast.  Pt reports feeling lump in right breast x 3 yrs.  She does note increase in size.  Denies any pain or nipple discharge bilaterally.  Biopsy done in the past revealed it is a fibroadenoma.  Otherwise pt feels well today and denies any acute symptoms.

## 2024-01-23 ENCOUNTER — TRANSCRIPTION ENCOUNTER (OUTPATIENT)
Age: 29
End: 2024-01-23

## 2024-01-23 NOTE — ASU PATIENT PROFILE, ADULT - FALL HARM RISK - UNIVERSAL INTERVENTIONS
Bed in lowest position, wheels locked, appropriate side rails in place/Call bell, personal items and telephone in reach/Instruct patient to call for assistance before getting out of bed or chair/Non-slip footwear when patient is out of bed/[place] to call system/Physically safe environment - no spills, clutter or unnecessary equipment/Purposeful Proactive Rounding/Room/bathroom lighting operational, light cord in reach

## 2024-01-24 ENCOUNTER — APPOINTMENT (OUTPATIENT)
Dept: SURGICAL ONCOLOGY | Facility: HOSPITAL | Age: 29
End: 2024-01-24

## 2024-01-24 ENCOUNTER — RESULT REVIEW (OUTPATIENT)
Age: 29
End: 2024-01-24

## 2024-01-24 ENCOUNTER — OUTPATIENT (OUTPATIENT)
Dept: OUTPATIENT SERVICES | Facility: HOSPITAL | Age: 29
LOS: 1 days | End: 2024-01-24
Payer: COMMERCIAL

## 2024-01-24 ENCOUNTER — APPOINTMENT (OUTPATIENT)
Dept: PLASTIC SURGERY | Facility: HOSPITAL | Age: 29
End: 2024-01-24
Payer: COMMERCIAL

## 2024-01-24 ENCOUNTER — TRANSCRIPTION ENCOUNTER (OUTPATIENT)
Age: 29
End: 2024-01-24

## 2024-01-24 VITALS
SYSTOLIC BLOOD PRESSURE: 103 MMHG | OXYGEN SATURATION: 99 % | DIASTOLIC BLOOD PRESSURE: 68 MMHG | TEMPERATURE: 97 F | HEART RATE: 67 BPM | RESPIRATION RATE: 16 BRPM

## 2024-01-24 VITALS
DIASTOLIC BLOOD PRESSURE: 66 MMHG | HEIGHT: 66 IN | OXYGEN SATURATION: 99 % | RESPIRATION RATE: 13 BRPM | TEMPERATURE: 98 F | WEIGHT: 167.11 LBS | HEART RATE: 59 BPM | SYSTOLIC BLOOD PRESSURE: 105 MMHG

## 2024-01-24 DIAGNOSIS — Z91.89 OTHER SPECIFIED PERSONAL RISK FACTORS, NOT ELSEWHERE CLASSIFIED: ICD-10-CM

## 2024-01-24 DIAGNOSIS — Z98.890 OTHER SPECIFIED POSTPROCEDURAL STATES: Chronic | ICD-10-CM

## 2024-01-24 DIAGNOSIS — D24.1 BENIGN NEOPLASM OF RIGHT BREAST: ICD-10-CM

## 2024-01-24 PROCEDURE — 76098 X-RAY EXAM SURGICAL SPECIMEN: CPT

## 2024-01-24 PROCEDURE — C1889: CPT

## 2024-01-24 PROCEDURE — 19285 PERQ DEV BREAST 1ST US IMAG: CPT

## 2024-01-24 PROCEDURE — 76098 X-RAY EXAM SURGICAL SPECIMEN: CPT | Mod: 26

## 2024-01-24 PROCEDURE — 19120 REMOVAL OF BREAST LESION: CPT | Mod: RT

## 2024-01-24 PROCEDURE — 88305 TISSUE EXAM BY PATHOLOGIST: CPT

## 2024-01-24 PROCEDURE — 19285 PERQ DEV BREAST 1ST US IMAG: CPT | Mod: RT

## 2024-01-24 PROCEDURE — XXXXX: CPT | Mod: 1L

## 2024-01-24 PROCEDURE — 88305 TISSUE EXAM BY PATHOLOGIST: CPT | Mod: 26

## 2024-01-24 PROCEDURE — 19125 EXCISION BREAST LESION: CPT | Mod: RT

## 2024-01-24 DEVICE — ARISTA 3GR: Type: IMPLANTABLE DEVICE | Site: RIGHT | Status: FUNCTIONAL

## 2024-01-24 RX ORDER — HYDROMORPHONE HYDROCHLORIDE 2 MG/ML
1 INJECTION INTRAMUSCULAR; INTRAVENOUS; SUBCUTANEOUS
Refills: 0 | Status: DISCONTINUED | OUTPATIENT
Start: 2024-01-24 | End: 2024-01-24

## 2024-01-24 RX ORDER — HYDROMORPHONE HYDROCHLORIDE 2 MG/ML
0.5 INJECTION INTRAMUSCULAR; INTRAVENOUS; SUBCUTANEOUS
Refills: 0 | Status: DISCONTINUED | OUTPATIENT
Start: 2024-01-24 | End: 2024-01-24

## 2024-01-24 RX ORDER — SODIUM CHLORIDE 9 MG/ML
1000 INJECTION, SOLUTION INTRAVENOUS
Refills: 0 | Status: DISCONTINUED | OUTPATIENT
Start: 2024-01-24 | End: 2024-01-24

## 2024-01-24 RX ORDER — HEPARIN SODIUM 5000 [USP'U]/ML
5000 INJECTION INTRAVENOUS; SUBCUTANEOUS ONCE
Refills: 0 | Status: COMPLETED | OUTPATIENT
Start: 2024-01-24 | End: 2024-01-24

## 2024-01-24 RX ORDER — ERGOCALCIFEROL 1.25 MG/1
0 CAPSULE ORAL
Refills: 0 | DISCHARGE

## 2024-01-24 RX ORDER — ONDANSETRON 8 MG/1
4 TABLET, FILM COATED ORAL ONCE
Refills: 0 | Status: DISCONTINUED | OUTPATIENT
Start: 2024-01-24 | End: 2024-01-24

## 2024-01-24 RX ADMIN — HEPARIN SODIUM 5000 UNIT(S): 5000 INJECTION INTRAVENOUS; SUBCUTANEOUS at 12:19

## 2024-01-24 NOTE — ASU DISCHARGE PLAN (ADULT/PEDIATRIC) - PROCEDURE
Excision of enlarging right breast mass preoperative localization and intraoperative plastic surgery consult for evaluation and management of surgical defect. Excision of enlarging right breast mass preoperative localization

## 2024-01-24 NOTE — ASU DISCHARGE PLAN (ADULT/PEDIATRIC) - NS MD DC FALL RISK RISK
For information on Fall & Injury Prevention, visit: [email or web address] OR  [email or web address] OR  https://www.cdc.gov/steadi/patient.html

## 2024-01-24 NOTE — BRIEF OPERATIVE NOTE - NSICDXBRIEFPROCEDURE_GEN_ALL_CORE_FT
PROCEDURES:  Lumpectomy of right breast mass 24-Jan-2024 13:56:13 needle localization Jeffry Mcclure

## 2024-01-24 NOTE — ASU DISCHARGE PLAN (ADULT/PEDIATRIC) - ASU DC SPECIAL INSTRUCTIONSFT
Initial followup with plastic surgery in the next 5-15 days, regarding matters of bandages, activities, and showering.    Dr. Umana should call with pathology report in approximately 2 weeks.  The conversation will determine further management. Maintain mammary support for 48 hours after getting home.    At that time, may remove garment, surgical tape, and gauze.    Do not remove Steri-Strips.    May shower after above.    After showering, do not need to reapply a dressing.    Should wear mammary support, or own bra, even at night for the next 5 days.    Dr. Umana should call with pathology report in approximately 2 weeks, that conversation will determine further management

## 2024-01-24 NOTE — ASU DISCHARGE PLAN (ADULT/PEDIATRIC) - CARE PROVIDER_API CALL
[facility]  [address] [place] [id number]  Phone: [phone]  Fax: [phone]  Follow Up Time: 2 weeks

## 2024-01-24 NOTE — ASU DISCHARGE PLAN (ADULT/PEDIATRIC) - PROVIDER TOKENS
How Severe Are They?: mild Is This A New Presentation, Or A Follow-Up?: Follow Up [facility] PROVIDER:[TOKEN:[[id number]:[id number]],FOLLOWUP:[2 weeks]]

## 2024-01-30 PROBLEM — D24.1 BENIGN NEOPLASM OF RIGHT BREAST: Chronic | Status: ACTIVE | Noted: 2024-01-18

## 2024-01-31 LAB — SURGICAL PATHOLOGY STUDY: SIGNIFICANT CHANGE UP

## 2024-02-01 ENCOUNTER — APPOINTMENT (OUTPATIENT)
Dept: PLASTIC SURGERY | Facility: CLINIC | Age: 29
End: 2024-02-01

## 2024-02-08 NOTE — REASON FOR VISIT
[FreeTextEntry2] : Enlarging RIGHT BREAST biopsy-proven fibroadenoma, breast cancer risk score = 41.7.

## 2024-02-08 NOTE — HISTORY OF PRESENT ILLNESS
[de-identified] : 28-year-old lady.  Daughter of my patient Sagrario who unfortunately passed away at age 55 of metastatic breast cancer, in May 2023. 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  Referred by radiology ().  CC: Enlarging RIGHT BREAST biopsy-proven fibroadenoma, for which excision has been recommended to assure there is no worrisome pathology.  This is an asymptomatic palpable finding that she has been aware of since ~. It is gradually become more prominent.  No other signs or symptoms related to either breast.   Sequence of diagnostic imaging (/[facility]): 2022: 1.3 x 0.7 x 1.3 cm. 2023: 1.6 x 0.9 x 1.9 cm. 2023: 2.2 x 1.2 x 2.2 cm   + Prior personal history: 2022: RIGHT breast (11:00) core needle biopsy: Fibroadenoma.  Biopsy was performed at [facility].  + R-SHAPED biopsy clip.  Pathology is from [place].   + Additional personal history: 2019: LEFT breast sonogram guided core biopsy at : Fibroadenoma. + STRAIGHT-CLIP.   + FH: Mother: Breast cancer. She was diagnosed at age 52 and passed away at age 56 from metastatic disease.  + Paternal cousin with breast cancer.  + Maternal grandmother: Ovarian cancer.  Not Ashkenazi.  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 Testing was submitted by her gynecologist at that time, ): Dr. Giorgio Betts.  No other relatives with a history of malignancy.   Menarche at 11.  1, para 0. + Oral contraceptives.   PMD: Dr. Yodit ROTH.  + ALLERGIC: Motrin.  No pacemaker or defibrillator. No anticoagulants.  + Oral contraceptives.   GYN: Dr. Gregoria MINAYA. Visit in  was unremarkable.  2023, she had an upper endoscopy for the evaluation of heartburn. GI: Dr. Rayshawn GIRALDO. 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

## 2024-02-08 NOTE — ASSESSMENT
[FreeTextEntry1] :  28-year-old lady.  Enlarging RIGHT BREAST biopsy-proven fibroadenoma,  Explained the concern about a possible phyllodes tumor and the rationale for excision to evaluate this possibility.  The operation would be done in conjunction with radiology for preoperative localization, and post-procedure mammography to assure removal of the mass and documentation of the clip being in the surgical specimen. They would also be coordinated with plastic surgery due to the anatomic location, and cosmetically sensitive nature of the procedure.  Due to her breast cancer risk of 41.7 I have suggested a baseline breast MRI. She understands and agrees. Paperwork entered to have the study done through our system.  I retained her imaging from  and R is submitted that for entry.  She and I will speak after the breast MRI, and that conversation will guide further management.  Reviewed in detail, all questions answered.   12/18/2023: Baseline bilateral breast MRI today: BI-RADS 2. Previously seen right breast fibroadenoma at 11:00 (1.9 cm). Endorsed prior recommendation of excision.   1/09/24. I returned her call. We spoke on the phone. We reviewed the above MRI results. She would like to schedule excision of the enlarging right breast fibroadenoma. I explained the operative approach, risk, benefits, alternatives, possible surgical outcomes. Due to the presence of the biopsy clip I suggested preoperative localization by radiology. Due to the anatomic location of the lesion, I suggested oncoplastic closure by plastics. She understands and agrees. Paperwork for surgical scheduling submitted via email this evening.   2/8/2024: We spoke on the phone. January 24, 2024, she had excision of an enlarging fibroadenoma from her right breast. Primary closure. Surgical pathology: 2.3 cm fibroadenoma. Clinically, this was completely excised.  Presently, no further intervention is warranted.  I have asked her to see me in the summer, sooner if needed.

## 2024-02-11 PROBLEM — D24.1 FIBROADENOMA OF RIGHT BREAST: Status: ACTIVE | Noted: 2023-12-06

## 2024-02-11 NOTE — ASSESSMENT
[FreeTextEntry1] : 28-year-old lady.  1/24/2024: Excision of an enlarging fibroadenoma from the right breast at the areolar margin with clinically negative margins. Surgical pathology: 2.3 cm fibroadenoma.  Recuperating well.  Presently, no further treatment is warranted.  June 2023: Bilateral breast MRI at [facility]: BI-RADS 3. Presently due for bilateral breast ultrasounds at [facility]. Prescription provided.  I have asked her to call me a week after the imaging to discuss the results.   Breast cancer risk score = 41.7. December 2023: Preoperative breast MRI (baseline) BI-RADS 2. Will repeat periodically, balancing her increased risk of breast cancer against the concern of cumulative gadolinium exposure........................    If asymptomatic, with normal imaging, I have offered to continue to see her annually, sooner if needed.  Reviewed in detail, all questions answered.

## 2024-02-11 NOTE — HISTORY OF PRESENT ILLNESS
[de-identified] : 28-year-old lady.  2024: Excision of an enlarging, biopsy-proven, fibroadenoma of the right breast (superior/para-areolar. Clinically negative margins. Primary closure (oncoplastic closure denied by insurance company). Surgical pathology: 2.3 cm fibroadenoma.   Breast cancer risk score = 41.7.   2023: Preoperative breast MRI: BI-RADS 2.   CC:   Daughter of my patient Logan who unfortunately passed away at age 55 of metastatic breast cancer, in May 2023. 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  Referred by radiology ().  CC: Enlarging RIGHT BREAST biopsy-proven fibroadenoma, for which excision has been recommended to assure there is no worrisome pathology.  This is an asymptomatic palpable finding that she has been aware of since ~. It is gradually become more prominent.  No other signs or symptoms related to either breast.   Sequence of diagnostic imaging (/[facility]): 2022: 1.3 x 0.7 x 1.3 cm. 2023: 1.6 x 0.9 x 1.9 cm. 2023: 2.2 x 1.2 x 2.2 cm   + Prior personal history: 2022: RIGHT breast (11:00) core needle biopsy: Fibroadenoma.  Biopsy was performed at [facility].  + R-SHAPED biopsy clip.  Pathology is from [place].   + Additional personal history: 2019: LEFT breast sonogram guided core biopsy at : Fibroadenoma. + STRAIGHT-CLIP.   + FH: Mother: Breast cancer. She was diagnosed at age 52 and passed away at age 56 from metastatic disease.  + Paternal cousin with breast cancer.  + Maternal grandmother: Ovarian cancer.  Not Ashkenazi.  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 Testing was submitted by her gynecologist at that time, ): Dr. Giorgio Betts.  No other relatives with a history of malignancy.   Menarche at 11.  1, para 0. + Oral contraceptives.   PMD: Dr. Yodit ROTH.  + ALLERGIC: Motrin.  No pacemaker or defibrillator. No anticoagulants.  + Oral contraceptives.   GYN: Dr. Gregoria MINAYA. Visit in  was unremarkable.  2023, she had an upper endoscopy for the evaluation of heartburn. GI: Dr. Rayshawn GIRALDO. 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

## 2024-02-11 NOTE — REVIEW OF SYSTEMS
[Negative] : Musculoskeletal [FreeTextEntry7] : Allergic to Motrin [FreeTextEntry8] : Heartburn [de-identified] : + BCP [FreeTextEntry1] : Increased risk of breast cancer

## 2024-02-11 NOTE — PHYSICAL EXAM
[Normal] : supple, no neck mass and thyroid not enlarged [Normal Neck Lymph Nodes] : normal neck lymph nodes  [Normal Supraclavicular Lymph Nodes] : normal supraclavicular lymph nodes [Normal Axillary Lymph Nodes] : normal axillary lymph nodes [Normal] : normal appearance, no rash, nodules, vesicles, ulcers, erythema [de-identified] : See diagram [de-identified] : Groins not examined

## 2024-02-11 NOTE — REASON FOR VISIT
[Post-Op] : a post-op for [FreeTextEntry2] : Excision of an enlarging fibroadenoma from the right breast

## 2024-02-12 ENCOUNTER — APPOINTMENT (OUTPATIENT)
Dept: SURGICAL ONCOLOGY | Facility: CLINIC | Age: 29
End: 2024-02-12

## 2024-02-12 DIAGNOSIS — D24.1 BENIGN NEOPLASM OF RIGHT BREAST: ICD-10-CM

## 2024-03-05 ENCOUNTER — NON-APPOINTMENT (OUTPATIENT)
Age: 29
End: 2024-03-05

## 2024-08-24 ENCOUNTER — EMERGENCY (EMERGENCY)
Facility: HOSPITAL | Age: 29
LOS: 1 days | Discharge: HOME | End: 2024-08-24
Attending: STUDENT IN AN ORGANIZED HEALTH CARE EDUCATION/TRAINING PROGRAM
Payer: COMMERCIAL

## 2024-08-24 VITALS
RESPIRATION RATE: 18 BRPM | DIASTOLIC BLOOD PRESSURE: 72 MMHG | SYSTOLIC BLOOD PRESSURE: 128 MMHG | TEMPERATURE: 98 F | HEART RATE: 80 BPM | OXYGEN SATURATION: 97 %

## 2024-08-24 DIAGNOSIS — Z98.890 OTHER SPECIFIED POSTPROCEDURAL STATES: Chronic | ICD-10-CM

## 2024-08-24 LAB
ALBUMIN SERPL ELPH-MCNC: 4.7 G/DL — SIGNIFICANT CHANGE UP (ref 3.3–5.2)
ALP SERPL-CCNC: 107 U/L — SIGNIFICANT CHANGE UP (ref 40–120)
ALT FLD-CCNC: 21 U/L — SIGNIFICANT CHANGE UP
ANION GAP SERPL CALC-SCNC: 17 MMOL/L — SIGNIFICANT CHANGE UP (ref 5–17)
APTT BLD: 29.1 SEC — SIGNIFICANT CHANGE UP (ref 24.5–35.6)
AST SERPL-CCNC: 28 U/L — SIGNIFICANT CHANGE UP
BASOPHILS # BLD AUTO: 0.04 K/UL — SIGNIFICANT CHANGE UP (ref 0–0.2)
BASOPHILS NFR BLD AUTO: 0.4 % — SIGNIFICANT CHANGE UP (ref 0–2)
BILIRUB SERPL-MCNC: 0.2 MG/DL — LOW (ref 0.4–2)
BLD GP AB SCN SERPL QL: SIGNIFICANT CHANGE UP
BUN SERPL-MCNC: 8.9 MG/DL — SIGNIFICANT CHANGE UP (ref 8–20)
CALCIUM SERPL-MCNC: 9.3 MG/DL — SIGNIFICANT CHANGE UP (ref 8.4–10.5)
CHLORIDE SERPL-SCNC: 101 MMOL/L — SIGNIFICANT CHANGE UP (ref 96–108)
CO2 SERPL-SCNC: 21 MMOL/L — LOW (ref 22–29)
CREAT SERPL-MCNC: 0.54 MG/DL — SIGNIFICANT CHANGE UP (ref 0.5–1.3)
EGFR: 127 ML/MIN/1.73M2 — SIGNIFICANT CHANGE UP
EGFR: 127 ML/MIN/1.73M2 — SIGNIFICANT CHANGE UP
EOSINOPHIL # BLD AUTO: 0.13 K/UL — SIGNIFICANT CHANGE UP (ref 0–0.5)
EOSINOPHIL NFR BLD AUTO: 1.1 % — SIGNIFICANT CHANGE UP (ref 0–6)
GLUCOSE SERPL-MCNC: 88 MG/DL — SIGNIFICANT CHANGE UP (ref 70–99)
HCG SERPL-ACNC: <4 MIU/ML — SIGNIFICANT CHANGE UP
HCT VFR BLD CALC: 41.6 % — SIGNIFICANT CHANGE UP (ref 34.5–45)
HGB BLD-MCNC: 14.3 G/DL — SIGNIFICANT CHANGE UP (ref 11.5–15.5)
IMM GRANULOCYTES NFR BLD AUTO: 0.4 % — SIGNIFICANT CHANGE UP (ref 0–0.9)
INR BLD: 0.94 RATIO — SIGNIFICANT CHANGE UP (ref 0.85–1.18)
LYMPHOCYTES # BLD AUTO: 18.1 % — SIGNIFICANT CHANGE UP (ref 13–44)
LYMPHOCYTES # BLD AUTO: 2.06 K/UL — SIGNIFICANT CHANGE UP (ref 1–3.3)
MCHC RBC-ENTMCNC: 30.2 PG — SIGNIFICANT CHANGE UP (ref 27–34)
MCHC RBC-ENTMCNC: 34.4 GM/DL — SIGNIFICANT CHANGE UP (ref 32–36)
MCV RBC AUTO: 87.9 FL — SIGNIFICANT CHANGE UP (ref 80–100)
MONOCYTES # BLD AUTO: 0.86 K/UL — SIGNIFICANT CHANGE UP (ref 0–0.9)
MONOCYTES NFR BLD AUTO: 7.5 % — SIGNIFICANT CHANGE UP (ref 2–14)
NEUTROPHILS # BLD AUTO: 8.27 K/UL — HIGH (ref 1.8–7.4)
NEUTROPHILS NFR BLD AUTO: 72.5 % — SIGNIFICANT CHANGE UP (ref 43–77)
PLATELET # BLD AUTO: 347 K/UL — SIGNIFICANT CHANGE UP (ref 150–400)
POTASSIUM SERPL-MCNC: 3.4 MMOL/L — LOW (ref 3.5–5.3)
POTASSIUM SERPL-SCNC: 3.4 MMOL/L — LOW (ref 3.5–5.3)
PROT SERPL-MCNC: 8.1 G/DL — SIGNIFICANT CHANGE UP (ref 6.6–8.7)
PROTHROM AB SERPL-ACNC: 10.4 SEC — SIGNIFICANT CHANGE UP (ref 9.5–13)
RBC # BLD: 4.73 M/UL — SIGNIFICANT CHANGE UP (ref 3.8–5.2)
RBC # FLD: 13.4 % — SIGNIFICANT CHANGE UP (ref 10.3–14.5)
SODIUM SERPL-SCNC: 139 MMOL/L — SIGNIFICANT CHANGE UP (ref 135–145)
WBC # BLD: 11.41 K/UL — HIGH (ref 3.8–10.5)
WBC # FLD AUTO: 11.41 K/UL — HIGH (ref 3.8–10.5)

## 2024-08-24 PROCEDURE — 73130 X-RAY EXAM OF HAND: CPT

## 2024-08-24 PROCEDURE — 86900 BLOOD TYPING SEROLOGIC ABO: CPT

## 2024-08-24 PROCEDURE — 85025 COMPLETE CBC W/AUTO DIFF WBC: CPT

## 2024-08-24 PROCEDURE — 96374 THER/PROPH/DIAG INJ IV PUSH: CPT | Mod: XU

## 2024-08-24 PROCEDURE — 73130 X-RAY EXAM OF HAND: CPT | Mod: 26,LT

## 2024-08-24 PROCEDURE — 26750 TREAT FINGER FRACTURE EACH: CPT | Mod: 54,F1

## 2024-08-24 PROCEDURE — 99285 EMERGENCY DEPT VISIT HI MDM: CPT | Mod: 25,57

## 2024-08-24 PROCEDURE — 36415 COLL VENOUS BLD VENIPUNCTURE: CPT

## 2024-08-24 PROCEDURE — 96375 TX/PRO/DX INJ NEW DRUG ADDON: CPT | Mod: XU

## 2024-08-24 PROCEDURE — 84702 CHORIONIC GONADOTROPIN TEST: CPT

## 2024-08-24 PROCEDURE — 12001 RPR S/N/AX/GEN/TRNK 2.5CM/<: CPT | Mod: 59

## 2024-08-24 PROCEDURE — 86850 RBC ANTIBODY SCREEN: CPT

## 2024-08-24 PROCEDURE — 85610 PROTHROMBIN TIME: CPT

## 2024-08-24 PROCEDURE — 80053 COMPREHEN METABOLIC PANEL: CPT

## 2024-08-24 PROCEDURE — 12001 RPR S/N/AX/GEN/TRNK 2.5CM/<: CPT

## 2024-08-24 PROCEDURE — 85730 THROMBOPLASTIN TIME PARTIAL: CPT

## 2024-08-24 PROCEDURE — 99284 EMERGENCY DEPT VISIT MOD MDM: CPT | Mod: 25

## 2024-08-24 PROCEDURE — 86901 BLOOD TYPING SEROLOGIC RH(D): CPT

## 2024-08-24 RX ORDER — OXYCODONE HYDROCHLORIDE AND ACETAMINOPHEN 10; 325 MG/1; MG/1
1 TABLET ORAL
Qty: 12 | Refills: 0
Start: 2024-08-24 | End: 2024-08-26

## 2024-08-24 RX ORDER — CEFAZOLIN SODIUM IN 0.9 % NACL 3 G/100 ML
2000 INTRAVENOUS SOLUTION, PIGGYBACK (ML) INTRAVENOUS ONCE
Refills: 0 | Status: COMPLETED | OUTPATIENT
Start: 2024-08-24 | End: 2024-08-24

## 2024-08-24 RX ORDER — LIDOCAINE HCL/PF 10 MG/ML
10 VIAL (ML) INJECTION ONCE
Refills: 0 | Status: COMPLETED | OUTPATIENT
Start: 2024-08-24 | End: 2024-08-24

## 2024-08-24 RX ORDER — CEPHALEXIN 250 MG/1
1 CAPSULE ORAL
Qty: 28 | Refills: 0
Start: 2024-08-24 | End: 2024-08-30

## 2024-08-24 RX ORDER — CEFAZOLIN SODIUM IN 0.9 % NACL 3 G/100 ML
2000 INTRAVENOUS SOLUTION, PIGGYBACK (ML) INTRAVENOUS ONCE
Refills: 0 | Status: DISCONTINUED | OUTPATIENT
Start: 2024-08-24 | End: 2024-08-24

## 2024-08-24 RX ORDER — FLUCONAZOLE 150 MG
1 TABLET ORAL
Qty: 1 | Refills: 0
Start: 2024-08-24 | End: 2024-08-24

## 2024-08-24 RX ADMIN — Medication 2000 MILLIGRAM(S): at 16:33

## 2024-08-24 RX ADMIN — Medication 10 MILLILITER(S): at 17:58

## 2024-08-24 RX ADMIN — Medication 4 MILLIGRAM(S): at 16:33

## 2024-08-26 ENCOUNTER — APPOINTMENT (OUTPATIENT)
Dept: ORTHOPEDIC SURGERY | Facility: CLINIC | Age: 29
End: 2024-08-26
Payer: COMMERCIAL

## 2024-08-26 VITALS
HEART RATE: 99 BPM | DIASTOLIC BLOOD PRESSURE: 67 MMHG | SYSTOLIC BLOOD PRESSURE: 110 MMHG | TEMPERATURE: 98.1 F | BODY MASS INDEX: 27.31 KG/M2 | WEIGHT: 160 LBS | HEIGHT: 64 IN

## 2024-08-26 DIAGNOSIS — M79.642 PAIN IN LEFT HAND: ICD-10-CM

## 2024-08-26 DIAGNOSIS — S62.609D FRACTURE OF UNSPECIFIED PHALANX OF UNSPECIFIED FINGER, SUBSEQUENT ENCOUNTER FOR FRACTURE WITH ROUTINE HEALING: ICD-10-CM

## 2024-08-26 DIAGNOSIS — S61.219D LACERATION W/OUT FOREIGN BODY OF UNSPECIFIED FINGER W/OUT DAMAGE TO NAIL, SUBSEQUENT ENCOUNTER: ICD-10-CM

## 2024-08-26 PROCEDURE — 99204 OFFICE O/P NEW MOD 45 MIN: CPT

## 2024-08-26 PROCEDURE — 73130 X-RAY EXAM OF HAND: CPT | Mod: 50

## 2024-08-26 NOTE — HISTORY OF PRESENT ILLNESS
[FreeTextEntry1] : Sravanthi is a very pleasant 29-year-old female who presents today with status post volar laceration to the left hand multiple fingers on 8-24 seen in the emergency room subsequently sutured.  He has been taking antibiotics.

## 2024-08-26 NOTE — ASSESSMENT
[FreeTextEntry1] : ASSESSMENT: The patient comes in today with acute onset of left hand pain subsequent to laceration injury volarly on 8-24.  No signs of infection has been taking antibiotics we have discussed proper wound care.  She does tell us she has a follow-up scheduled for suture removal in the next few days.  Her examination is reassuring she does have a fracture as well as the left index finger.  We have discussed activity modification she elects to proceed with nonoperative care and we will continue following   The patient was adequately and thoroughly informed of my assessment of their current condition(s).  - This may diminish bodily function for the extremity. We discussed prognosis, tx modalities including operative and nonoperative options for the above diagnostic assessment. As always, 2nd opinion is always provided as an option.  When accessible, I was able to review other physicians note(s) including reviewing other tests, imaging results as well as personally view these results for my own interpretation.   The patient was adequately and thoroughly informed of my assessment of their current condition(s). A prescription for meloxicam was given today. The patient was instructed to take this with food and discontinue other NSAIDs while taking this. The risks, benefits and black box warnings were discussed. The patient was instructed to discontinue the medication if it began hurting her stomach.  DISCUSSION: 1.  Meloxicam as above.  Volar lacerations dressed accordingly.  Follow-up for scheduled suture removal date with outside provider 2.  Left index P3 fracture.  Follow-up 3 weeks x-rays of left hand 3.  Continue antibiotics.  Wound care

## 2024-08-26 NOTE — PHYSICAL EXAM
[de-identified] : Examination of the left hand reveals excellent capillary refill at all fingertips.  There are volar lacerations with sutures about the index, middle, ring, small finger however examination is reassuring.  There are no signs of infection there is excellent cascade and tenodesis.  She actively flexes at the FDP and FDS for all fingers and denies numbness at the fingertips. [de-identified] : [4] views of [bilateral hands and wrists] were obtained today in my office and were seen by me and discussed with the patient.  These are showing findings of left index distal phalanx fracture minimal displacement

## 2024-09-03 RX ORDER — MELOXICAM 15 MG/1
15 TABLET ORAL DAILY
Qty: 14 | Refills: 0 | Status: ACTIVE | COMMUNITY
Start: 2024-09-03 | End: 1900-01-01

## 2024-09-17 ENCOUNTER — APPOINTMENT (OUTPATIENT)
Dept: ULTRASOUND IMAGING | Facility: CLINIC | Age: 29
End: 2024-09-17

## 2024-09-20 ENCOUNTER — APPOINTMENT (OUTPATIENT)
Dept: ORTHOPEDIC SURGERY | Facility: CLINIC | Age: 29
End: 2024-09-20

## 2024-09-29 ENCOUNTER — OUTPATIENT (OUTPATIENT)
Dept: OUTPATIENT SERVICES | Facility: HOSPITAL | Age: 29
LOS: 1 days | End: 2024-09-29
Payer: COMMERCIAL

## 2024-09-29 DIAGNOSIS — N94.6 DYSMENORRHEA, UNSPECIFIED: ICD-10-CM

## 2024-09-29 DIAGNOSIS — Z98.890 OTHER SPECIFIED POSTPROCEDURAL STATES: Chronic | ICD-10-CM

## 2024-09-29 PROCEDURE — 93975 VASCULAR STUDY: CPT

## 2024-09-29 PROCEDURE — 76830 TRANSVAGINAL US NON-OB: CPT

## 2024-10-02 ENCOUNTER — APPOINTMENT (OUTPATIENT)
Dept: ULTRASOUND IMAGING | Facility: CLINIC | Age: 29
End: 2024-10-02

## 2024-10-27 ENCOUNTER — NON-APPOINTMENT (OUTPATIENT)
Age: 29
End: 2024-10-27

## 2025-01-10 ENCOUNTER — APPOINTMENT (OUTPATIENT)
Dept: MRI IMAGING | Facility: IMAGING CENTER | Age: 30
End: 2025-01-10
Payer: COMMERCIAL

## 2025-01-10 ENCOUNTER — OUTPATIENT (OUTPATIENT)
Dept: OUTPATIENT SERVICES | Facility: HOSPITAL | Age: 30
LOS: 1 days | End: 2025-01-10
Payer: COMMERCIAL

## 2025-01-10 DIAGNOSIS — Z00.8 ENCOUNTER FOR OTHER GENERAL EXAMINATION: ICD-10-CM

## 2025-01-10 DIAGNOSIS — Z98.890 OTHER SPECIFIED POSTPROCEDURAL STATES: Chronic | ICD-10-CM

## 2025-01-10 PROCEDURE — 72141 MRI NECK SPINE W/O DYE: CPT

## 2025-01-10 PROCEDURE — 72141 MRI NECK SPINE W/O DYE: CPT | Mod: 26

## (undated) DEVICE — PACK MINOR

## (undated) DEVICE — GLV 7.5 PROTEXIS (CREAM) NEU-THERA

## (undated) DEVICE — SUT POLYSORB 2-0 30" GS-21 UNDYED

## (undated) DEVICE — DRAPE 1/2 SHEET 40X57"

## (undated) DEVICE — DRSG TELFA 3 X 8

## (undated) DEVICE — SUT SOFSILK 2-0 18" C-23

## (undated) DEVICE — WARMING BLANKET LOWER ADULT

## (undated) DEVICE — DRSG CURITY GAUZE SPONGE 4 X 4" 12-PLY

## (undated) DEVICE — MEDICATION LABELS W MARKER

## (undated) DEVICE — DRSG MAMMARY SUPPORT LG SIZE 4

## (undated) DEVICE — DRAPE INSTRUMENT POUCH 6.75" X 11"

## (undated) DEVICE — SYR LUER LOK 10CC

## (undated) DEVICE — SPECIMEN CONTAINER 100ML

## (undated) DEVICE — VENODYNE/SCD SLEEVE CALF MEDIUM

## (undated) DEVICE — SOL IRR POUR NS 0.9% 500ML

## (undated) DEVICE — DRSG MAMMARY SUPPORT MED SIZE 2

## (undated) DEVICE — DRAPE TOWEL BLUE 17" X 24"

## (undated) DEVICE — DRSG COMBINE 5X9"

## (undated) DEVICE — DRSG TEGADERM 1.75X1.75"

## (undated) DEVICE — LAP PAD 18 X 18"

## (undated) DEVICE — SOL IRR POUR H2O 250ML

## (undated) DEVICE — GAMMA SLEEVE DISPOSABLE

## (undated) DEVICE — SUT MONOCRYL 4-0 27" PS-2 UNDYED

## (undated) DEVICE — SUT MONOCRYL 3-0 27" PS-2 UNDYED

## (undated) DEVICE — DRAPE CHEST BREAST 106" X 122"

## (undated) DEVICE — ELCTR STRYKER NEPTUNE SMOKE EVACUATION PENCIL (GREEN)

## (undated) DEVICE — DRSG MAMMARY SUPPORT MED SIZE 3